# Patient Record
Sex: MALE | Race: WHITE | URBAN - METROPOLITAN AREA
[De-identification: names, ages, dates, MRNs, and addresses within clinical notes are randomized per-mention and may not be internally consistent; named-entity substitution may affect disease eponyms.]

---

## 2017-05-22 ENCOUNTER — INPATIENT (INPATIENT)
Facility: HOSPITAL | Age: 47
LOS: 0 days | Discharge: HOME | End: 2017-05-23
Attending: EMERGENCY MEDICINE | Admitting: GENERAL PRACTICE

## 2017-06-28 DIAGNOSIS — Z87.891 PERSONAL HISTORY OF NICOTINE DEPENDENCE: ICD-10-CM

## 2017-06-28 DIAGNOSIS — R20.2 PARESTHESIA OF SKIN: ICD-10-CM

## 2017-06-28 DIAGNOSIS — R07.89 OTHER CHEST PAIN: ICD-10-CM

## 2017-06-28 DIAGNOSIS — R07.9 CHEST PAIN, UNSPECIFIED: ICD-10-CM

## 2017-06-28 DIAGNOSIS — R06.02 SHORTNESS OF BREATH: ICD-10-CM

## 2018-04-04 ENCOUNTER — OUTPATIENT (OUTPATIENT)
Dept: OUTPATIENT SERVICES | Facility: HOSPITAL | Age: 48
LOS: 1 days | Discharge: HOME | End: 2018-04-04

## 2018-04-04 DIAGNOSIS — R91.1 SOLITARY PULMONARY NODULE: ICD-10-CM

## 2018-09-20 ENCOUNTER — EMERGENCY (EMERGENCY)
Facility: HOSPITAL | Age: 48
LOS: 0 days | Discharge: HOME | End: 2018-09-20
Attending: EMERGENCY MEDICINE | Admitting: EMERGENCY MEDICINE

## 2018-09-20 VITALS
TEMPERATURE: 99 F | OXYGEN SATURATION: 98 % | HEIGHT: 69 IN | HEART RATE: 81 BPM | WEIGHT: 220.02 LBS | RESPIRATION RATE: 18 BRPM | DIASTOLIC BLOOD PRESSURE: 85 MMHG | SYSTOLIC BLOOD PRESSURE: 132 MMHG

## 2018-09-20 DIAGNOSIS — M25.519 PAIN IN UNSPECIFIED SHOULDER: ICD-10-CM
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
## 2018-09-20 DIAGNOSIS — S29.011A STRAIN OF MUSCLE AND TENDON OF FRONT WALL OF THORAX, INITIAL ENCOUNTER: ICD-10-CM

## 2018-09-20 DIAGNOSIS — M25.511 PAIN IN RIGHT SHOULDER: ICD-10-CM

## 2018-09-20 RX ORDER — ACETAMINOPHEN 500 MG
650 TABLET ORAL ONCE
Qty: 0 | Refills: 0 | Status: COMPLETED | OUTPATIENT
Start: 2018-09-20 | End: 2018-09-20

## 2018-09-20 RX ADMIN — Medication 650 MILLIGRAM(S): at 06:24

## 2018-09-20 NOTE — ED PROVIDER NOTE - PHYSICAL EXAMINATION
CONSTITUTIONAL: Well-developed; well-nourished; in no acute distress.   SKIN: warm, dry; no overlying erythema noted; +swelling over right lateral chest wall with tenderness to palpation, asymmetrical compared to left side   NECK: Supple; non tender. no midline c-spine tenderness   EXT: decreased right shoulder abduction  NEURO: Alert, oriented, grossly unremarkable; C5-C8 sensation and strength intact   PSYCH: Cooperative, appropriate.

## 2018-09-20 NOTE — ED PROVIDER NOTE - CARE PLAN
Principal Discharge DX:	Muscle strain Principal Discharge DX:	Pectoral muscle rupture, initial encounter

## 2018-09-20 NOTE — ED PROVIDER NOTE - ATTENDING CONTRIBUTION TO CARE
I personally evaluated the patient. I reviewed the Resident’s or Physician Assistant’s note (as assigned above), and agree with the findings and plan except as documented in my note.  47 yr M, otherwise healthy with complaints of right chest and right upper arm pain and swelling that occurred about 1.5 hours ago while he was doing a bench press. He reports hearing a snap and then the onset of pain. No direct trauma to the chest. No skin color changes, no open wounds. VS reviewed, pt well appearing, NAD. Head ncat, normal s1s2 without any murmurs, Lungs CTAB with normal work of breathing. + edema and ttp of the right lateral pectoralis major muscle and also right proximal anterior humerus, painful abduction of the right shoulder, normal flexion, adduction, internal rotation, normal radial and brachial pulse b/l, no sensation deficits, abd +BS, s/nd, extremities wnl, neuro exam grossly normal. No acute skin rashes. Plan is left shoulder xray and reassess.

## 2018-09-20 NOTE — ED PROVIDER NOTE - OBJECTIVE STATEMENT
46 y/o male with no pmhx presents with acute right shoulder pain. Patient states he was doing bench presses earlier this morning, when he had a sudden sharp pain in right shoulder, chest area with a popping noise. Patient denies ever injuring this shoulder. Denies numbness/tingling.

## 2018-09-20 NOTE — ED PROVIDER NOTE - PROGRESS NOTE DETAILS
Discussed with pt the need for ortho follow up and his xray with a lucent lesion  pending radiology report.

## 2018-09-20 NOTE — ED PROVIDER NOTE - NS ED ROS FT
Constitutional: See HPI.  ENMT: No neck pain or stiffness.  MS: +right shoulder/chest pain  Neuro: No numbness/tingling                                    Skin: No redness; + swelling noted over right chest wall   Endo: No hx of DM, thyroid disease  Except as documented in HPI, all other review of systems is negative

## 2018-09-24 ENCOUNTER — OUTPATIENT (OUTPATIENT)
Dept: OUTPATIENT SERVICES | Facility: HOSPITAL | Age: 48
LOS: 1 days | Discharge: HOME | End: 2018-09-24

## 2018-09-24 VITALS
RESPIRATION RATE: 16 BRPM | DIASTOLIC BLOOD PRESSURE: 78 MMHG | TEMPERATURE: 98 F | OXYGEN SATURATION: 100 % | HEIGHT: 69 IN | SYSTOLIC BLOOD PRESSURE: 129 MMHG | WEIGHT: 220.9 LBS | HEART RATE: 66 BPM

## 2018-09-24 DIAGNOSIS — Z01.818 ENCOUNTER FOR OTHER PREPROCEDURAL EXAMINATION: ICD-10-CM

## 2018-09-24 DIAGNOSIS — S29.011A STRAIN OF MUSCLE AND TENDON OF FRONT WALL OF THORAX, INITIAL ENCOUNTER: ICD-10-CM

## 2018-09-24 DIAGNOSIS — Z90.49 ACQUIRED ABSENCE OF OTHER SPECIFIED PARTS OF DIGESTIVE TRACT: Chronic | ICD-10-CM

## 2018-09-24 LAB
ALBUMIN SERPL ELPH-MCNC: 4.3 G/DL — SIGNIFICANT CHANGE UP (ref 3.5–5.2)
ALP SERPL-CCNC: 78 U/L — SIGNIFICANT CHANGE UP (ref 30–115)
ALT FLD-CCNC: 57 U/L — HIGH (ref 0–41)
ANION GAP SERPL CALC-SCNC: 19 MMOL/L — HIGH (ref 7–14)
APTT BLD: 30.4 SEC — SIGNIFICANT CHANGE UP (ref 27–39.2)
AST SERPL-CCNC: 43 U/L — HIGH (ref 0–41)
BASOPHILS # BLD AUTO: 0.04 K/UL — SIGNIFICANT CHANGE UP (ref 0–0.2)
BASOPHILS NFR BLD AUTO: 0.3 % — SIGNIFICANT CHANGE UP (ref 0–1)
BILIRUB SERPL-MCNC: 0.5 MG/DL — SIGNIFICANT CHANGE UP (ref 0.2–1.2)
BUN SERPL-MCNC: 17 MG/DL — SIGNIFICANT CHANGE UP (ref 10–20)
CALCIUM SERPL-MCNC: 9.7 MG/DL — SIGNIFICANT CHANGE UP (ref 8.5–10.1)
CHLORIDE SERPL-SCNC: 100 MMOL/L — SIGNIFICANT CHANGE UP (ref 98–110)
CO2 SERPL-SCNC: 23 MMOL/L — SIGNIFICANT CHANGE UP (ref 17–32)
CREAT SERPL-MCNC: 1 MG/DL — SIGNIFICANT CHANGE UP (ref 0.7–1.5)
EOSINOPHIL # BLD AUTO: 0.23 K/UL — SIGNIFICANT CHANGE UP (ref 0–0.7)
EOSINOPHIL NFR BLD AUTO: 1.9 % — SIGNIFICANT CHANGE UP (ref 0–8)
GLUCOSE SERPL-MCNC: 53 MG/DL — LOW (ref 70–99)
HCT VFR BLD CALC: 47.3 % — SIGNIFICANT CHANGE UP (ref 42–52)
HGB BLD-MCNC: 16.5 G/DL — SIGNIFICANT CHANGE UP (ref 14–18)
IMM GRANULOCYTES NFR BLD AUTO: 0.3 % — SIGNIFICANT CHANGE UP (ref 0.1–0.3)
INR BLD: 1.12 RATIO — SIGNIFICANT CHANGE UP (ref 0.65–1.3)
LYMPHOCYTES # BLD AUTO: 19.4 % — LOW (ref 20.5–51.1)
LYMPHOCYTES # BLD AUTO: 2.3 K/UL — SIGNIFICANT CHANGE UP (ref 1.2–3.4)
MCHC RBC-ENTMCNC: 30.3 PG — SIGNIFICANT CHANGE UP (ref 27–31)
MCHC RBC-ENTMCNC: 34.9 G/DL — SIGNIFICANT CHANGE UP (ref 32–37)
MCV RBC AUTO: 86.8 FL — SIGNIFICANT CHANGE UP (ref 80–94)
MONOCYTES # BLD AUTO: 0.9 K/UL — HIGH (ref 0.1–0.6)
MONOCYTES NFR BLD AUTO: 7.6 % — SIGNIFICANT CHANGE UP (ref 1.7–9.3)
NEUTROPHILS # BLD AUTO: 8.35 K/UL — HIGH (ref 1.4–6.5)
NEUTROPHILS NFR BLD AUTO: 70.5 % — SIGNIFICANT CHANGE UP (ref 42.2–75.2)
NRBC # BLD: 0 /100 WBCS — SIGNIFICANT CHANGE UP (ref 0–0)
PLATELET # BLD AUTO: 205 K/UL — SIGNIFICANT CHANGE UP (ref 130–400)
POTASSIUM SERPL-MCNC: 4.3 MMOL/L — SIGNIFICANT CHANGE UP (ref 3.5–5)
POTASSIUM SERPL-SCNC: 4.3 MMOL/L — SIGNIFICANT CHANGE UP (ref 3.5–5)
PROT SERPL-MCNC: 7.6 G/DL — SIGNIFICANT CHANGE UP (ref 6–8)
PROTHROM AB SERPL-ACNC: 12.1 SEC — SIGNIFICANT CHANGE UP (ref 9.95–12.87)
RBC # BLD: 5.45 M/UL — SIGNIFICANT CHANGE UP (ref 4.7–6.1)
RBC # FLD: 13.2 % — SIGNIFICANT CHANGE UP (ref 11.5–14.5)
SODIUM SERPL-SCNC: 142 MMOL/L — SIGNIFICANT CHANGE UP (ref 135–146)
WBC # BLD: 11.86 K/UL — HIGH (ref 4.8–10.8)
WBC # FLD AUTO: 11.86 K/UL — HIGH (ref 4.8–10.8)

## 2018-09-24 NOTE — H&P PST ADULT - ADDITIONAL PE
no elvira tmd > 3 fbd neck from  left arm dominant rt arm sling fingers w and m brisk cap refill daniel test neg

## 2018-09-24 NOTE — H&P PST ADULT - REASON FOR ADMISSION
46 yr old man to past for right pectoralis major tendon repair pt is wt  and injury occurred 9/20 to ed xray ref to sx sent for mri now for sx left hand dominant NO fever no uti uri cp palp sob pain at this time Exercise tolerance is 3 flights no changes no elvira screen revd pt states to hsop 5/17 "panic attack " cardio wk up neg has cta on chart calcium score 0 noted on ct scan lll nodules ref to pulm saw 4/18 follow up scan yearly no changes

## 2018-09-24 NOTE — H&P PST ADULT - PMH
Anxiety  5/17 "panic attack" to ed wk up neg cardio  cta on chart noted left lower lobe nodules  Pulmonary nodules  lef tlower lobe dx 2017

## 2018-09-24 NOTE — H&P PST ADULT - NSANTHOSAYNRD_GEN_A_CORE
No. AGAPITO screening performed.  STOP BANG Legend: 0-2 = LOW Risk; 3-4 = INTERMEDIATE Risk; 5-8 = HIGH Risk

## 2018-09-25 DIAGNOSIS — J44.9 CHRONIC OBSTRUCTIVE PULMONARY DISEASE, UNSPECIFIED: ICD-10-CM

## 2018-10-03 NOTE — PROGRESS NOTE ADULT - SUBJECTIVE AND OBJECTIVE BOX
Document reviewed - MED. DIR. PAST - ANESTHESIA - Patient w/ h/o pulmonary nodules LLL since 2017 . Spoke to [facility] ,  - will proceed w/ surgery as planned .

## 2018-10-04 ENCOUNTER — OUTPATIENT (OUTPATIENT)
Dept: OUTPATIENT SERVICES | Facility: HOSPITAL | Age: 48
LOS: 1 days | Discharge: HOME | End: 2018-10-04

## 2018-10-04 VITALS
SYSTOLIC BLOOD PRESSURE: 152 MMHG | DIASTOLIC BLOOD PRESSURE: 74 MMHG | OXYGEN SATURATION: 95 % | HEART RATE: 80 BPM | RESPIRATION RATE: 19 BRPM

## 2018-10-04 VITALS — WEIGHT: 220.9 LBS | HEIGHT: 69 IN

## 2018-10-04 DIAGNOSIS — Z90.49 ACQUIRED ABSENCE OF OTHER SPECIFIED PARTS OF DIGESTIVE TRACT: Chronic | ICD-10-CM

## 2018-10-04 DIAGNOSIS — Z88.0 ALLERGY STATUS TO PENICILLIN: ICD-10-CM

## 2018-10-04 DIAGNOSIS — S46.811A STRAIN OF OTHER MUSCLES, FASCIA AND TENDONS AT SHOULDER AND UPPER ARM LEVEL, RIGHT ARM, INITIAL ENCOUNTER: ICD-10-CM
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
## 2018-10-04 DIAGNOSIS — Z87.891 PERSONAL HISTORY OF NICOTINE DEPENDENCE: ICD-10-CM

## 2018-10-04 DIAGNOSIS — F41.0 PANIC DISORDER [EPISODIC PAROXYSMAL ANXIETY]: ICD-10-CM
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
## 2018-10-04 RX ORDER — SODIUM CHLORIDE 9 MG/ML
1000 INJECTION, SOLUTION INTRAVENOUS
Qty: 0 | Refills: 0 | Status: DISCONTINUED | OUTPATIENT
Start: 2018-10-04 | End: 2018-10-19

## 2018-10-04 RX ORDER — MORPHINE SULFATE 50 MG/1
4 CAPSULE, EXTENDED RELEASE ORAL
Qty: 0 | Refills: 0 | Status: DISCONTINUED | OUTPATIENT
Start: 2018-10-04 | End: 2018-10-04

## 2018-10-04 RX ORDER — ONDANSETRON 8 MG/1
4 TABLET, FILM COATED ORAL ONCE
Qty: 0 | Refills: 0 | Status: DISCONTINUED | OUTPATIENT
Start: 2018-10-04 | End: 2018-10-19

## 2018-10-04 RX ORDER — OXYCODONE AND ACETAMINOPHEN 5; 325 MG/1; MG/1
2 TABLET ORAL ONCE
Qty: 0 | Refills: 0 | Status: DISCONTINUED | OUTPATIENT
Start: 2018-10-04 | End: 2018-10-04

## 2018-10-04 RX ORDER — HYDROMORPHONE HYDROCHLORIDE 2 MG/ML
0.5 INJECTION INTRAMUSCULAR; INTRAVENOUS; SUBCUTANEOUS
Qty: 0 | Refills: 0 | Status: DISCONTINUED | OUTPATIENT
Start: 2018-10-04 | End: 2018-10-04

## 2018-10-04 RX ORDER — MEPERIDINE HYDROCHLORIDE 50 MG/ML
12.5 INJECTION INTRAMUSCULAR; INTRAVENOUS; SUBCUTANEOUS ONCE
Qty: 0 | Refills: 0 | Status: DISCONTINUED | OUTPATIENT
Start: 2018-10-04 | End: 2018-10-04

## 2018-10-04 RX ORDER — OXYCODONE AND ACETAMINOPHEN 5; 325 MG/1; MG/1
1 TABLET ORAL
Qty: 12 | Refills: 0
Start: 2018-10-04

## 2018-10-04 RX ORDER — DEXAMETHASONE 0.5 MG/5ML
4 ELIXIR ORAL ONCE
Qty: 0 | Refills: 0 | Status: DISCONTINUED | OUTPATIENT
Start: 2018-10-04 | End: 2018-10-19

## 2018-10-04 RX ADMIN — MEPERIDINE HYDROCHLORIDE 12.5 MILLIGRAM(S): 50 INJECTION INTRAMUSCULAR; INTRAVENOUS; SUBCUTANEOUS at 16:52

## 2018-10-04 RX ADMIN — HYDROMORPHONE HYDROCHLORIDE 0.5 MILLIGRAM(S): 2 INJECTION INTRAMUSCULAR; INTRAVENOUS; SUBCUTANEOUS at 16:43

## 2018-10-04 RX ADMIN — MEPERIDINE HYDROCHLORIDE 12.5 MILLIGRAM(S): 50 INJECTION INTRAMUSCULAR; INTRAVENOUS; SUBCUTANEOUS at 18:29

## 2018-10-04 RX ADMIN — SODIUM CHLORIDE 125 MILLILITER(S): 9 INJECTION, SOLUTION INTRAVENOUS at 18:28

## 2018-10-04 RX ADMIN — HYDROMORPHONE HYDROCHLORIDE 0.5 MILLIGRAM(S): 2 INJECTION INTRAMUSCULAR; INTRAVENOUS; SUBCUTANEOUS at 18:29

## 2018-10-04 NOTE — ASU DISCHARGE PLAN (ADULT/PEDIATRIC). - SPECIAL INSTRUCTIONS
Post Operative Instructions for Shoulder Surgery    Your Surgery Included  [ ] Rotator Cuff Repair			  [ ] Debridement				  [ ] Biceps Tenodesis/Tenotomy	  [ ] Distal Clavicle Resection		  [ ] SLAP Repair  [ ] Instability Repair  [ ] Lysis of Adhesions/Manipulation  [x ] Other: pectoralis muscle repair  	  Call our office ([phone]) immediately if you experience any of the following:  •	Excessive bleeding or pus like drainage at the incision site  •	Uncontrollable pain not relieved by pain medication  •	Excessive swelling or redness at the incision site  •	Fever above 101.5 degrees not controlled with Tylenol or Motrin  •	Shortness of Breath  •	Any foul odor or blistering from the surgery site    Pain Management: You were given one or more of the following medication prescriptions before leaving the hospital. Have the prescriptions filled at a pharmacy on your way home and follow the instructions on the bottles.   Regional Anesthesia Injections (Blocks): You may have been given a regional nerve block either before or after surgery. This may numb your shoulder for 24-36 hours    Diet: Eat a bland diet for the first day after surgery. Progress your diet as tolerated. Constipation may occur with Narcotic usage, contact our office if you are experiencing constipation.    Activity: After you arrive at home, spend most of the first 24 hours resting in bed, on the couch, or in a reclining chair. After the first 24 hours at home, slowly increase your activity level based on your symptoms.    Dressing Change: Remove the dressing on the 3rd day. It is normal for some blood to be seen on the dressings. It is also normal for you to see apparent bruising on the skin around your shoulder when you remove the dressing. If present, leave the steri-strip tape across the incisions. If you are concerned by the drainage or the appearance of your shoulder, please call one of the numbers listed below. Keep incisions covered with Band-Aids/bandages.    Showering: You may shower on the 5th day after surgery if the wound is dry and clean, but do not let the wound soak in water until sutures are removed. Do not submerge in any water until after your postoperative appointment in clinic.    Shoulder Sling: You may have been sent home with a sling / pillow attachment holding your arm away from your body. You may remove the sling when changing clothes or bathing. Make sure to wear the sling while sleeping unless instructed otherwise. You may remove for exercises.    Shoulder Exercises: You may do these exercises for 2-5 mins five times a day in order to help regain your range of motion.  [ ] Shoulder Shrugs: Shrug your shoulders up and down  [ ] Pendulums: Bend forward allowing your arm to hang down in front of you. Gently swing your arm side-to-side and front to back  [x ] Elbow range of motion: Straighten and bend your elbow  [ ] Scapula Retractions: Squeeze shoulder blades together while slightly pulling them down  [ ] Passive Abduction: Have family member lift your arm away from your body bringing your elbow to the level of your shoulder  [ ] Shoulder rotation: With your arm at your side, have a family member rotate your arm internally and externally  [ ] Pulley exercises: Put a towel over the top of a door and face the door, use your good arm to pull your arm up in front of you    Follow Up: As Scheduled

## 2018-10-04 NOTE — BRIEF OPERATIVE NOTE - PROCEDURE
<<-----Click on this checkbox to enter Procedure Repair of pectoralis major muscle  10/04/2018    Active  OSWALDO

## 2018-10-04 NOTE — CHART NOTE - NSCHARTNOTEFT_GEN_A_CORE
Anesthesia Post Op Assessment  		(    ) Intubated           TV _____	Rate _sv____	FiO2__4LNC___  		(  x  ) Patent airway. Full return of protective reflexes  		(   x )Full recovery from anesthesia/sedation to baseline status      Cardiovascular Function:  		BP:	180/96                  Pulse:		93                  RR:		12                  Temp:		98.4                  O2Sat:             96%      Mental Status:  	        (  x  ) awake		  (  x  ) alert		 (    ) drowsy	               (    ) sedated      Nausea/Vomiting:  		(    ) Yes, See post-op orders		   (  x  ) No      Pain Scale: (0-10):			Treatment:     (    ) None	            (  x  ) See Post-Op/PCA Orders      Post-operative Fluids: 	   (    ) Oral	          ( x   ) See post-op Orders        Comments:    Uneventful. No complications from anesthesia.  Discharge home when criteria met.

## 2021-06-14 NOTE — ED ADULT TRIAGE NOTE - NS ED NOTE TRAVEL COUNTRIES
Constitutional: well-developed, normal communication ability.   Eyes: Conjunctivae and eyelids appear normal, no scleral icterus. Respiratory: symmetric expansion of chest wall, normal work of breathing   Gastrointestinal:  normoactive bowel sounds, soft, no tenderness, no rebound tenderness, no shifting dullness, no organomegaly.   Musculoskeletal: normal gait and station   Skin: no jaundice   Neurologic: Oriented to person, place, time. Short term memory intact.    Psychiatric: Normal mood and appropriate affect.
[place]

## 2021-10-06 PROBLEM — R91.8 OTHER NONSPECIFIC ABNORMAL FINDING OF LUNG FIELD: Chronic | Status: ACTIVE | Noted: 2018-09-24

## 2021-10-06 PROBLEM — F41.9 ANXIETY DISORDER, UNSPECIFIED: Chronic | Status: ACTIVE | Noted: 2018-09-24

## 2021-10-08 PROBLEM — Z00.00 ENCOUNTER FOR PREVENTIVE HEALTH EXAMINATION: Status: ACTIVE | Noted: 2021-10-08

## 2021-10-11 ENCOUNTER — APPOINTMENT (OUTPATIENT)
Age: 51
End: 2021-10-11

## 2022-02-23 NOTE — H&P PST ADULT - NS PRO AD NO ADVANCE DIRECTIVE
Pulmonary Progress Note    Date of admission  2/16/2022    Chief Complaint  74 y.o. female admitted 2/16/2022 with SOB    Hospital Course  74 y.o. female who presented 2/16/2022 with leg swelling b/l with cellulitis of right lower extremity, Sob and diarrhea. Rx for cellulitis and heart failure. Blood cxs negative   Hx of chronic obstructive pulmonary disease (no PFTs and no CT chest ), chronic hypoxemic respiratory failure on 2 L of oxygen at baseline, hypertension, diabetes, dyslipidemia and hypothyroidism and moderate aortic stenosis     Her Fio2 requirement went up from 2 l to 5 l and was wheezing and dyspneac on day2 of hospitalization  started on rx for COPD with steroids. She improved with Fio2 requirement back to baseline    2/19: Developed acute respiratory distress and AMS on 2/19/22 a.m- hypercapneic with ph 7.19/71/296 on 100% NRB  Pt Altered and not following commands  Troponin increased to 131 and ekg initially done concerning for ST depression and with AMS and paco2 dropped to 69 after 45 mins of BIPAP decision made to intubate.  Wbc increased to 16.7, creatinine worsening to 1.25, probnp 3459 and trop 131 with lactic 3.8,      D/w cardiology DR. Esquivel  re: NSTEMI  At this time resp distress from pulm cause hypercapneic resp failure  No ST elevation  Trend troponin if markedly in 1000  Level to reassess  Heparin, Asa, lipitor    Interval Problem Update  Chart review from the past 24 hours includes imaging, laboratory studies, vital signs and notes available.  Pertinent data for today's visit includes    Cardiac: VSS, leonidas on precedex   Pulm: 30%/8  Heme: stable  /renal: Cr now improving with diuresis  ID:  +GPCs in Bcx likely coag neg staph, repeat cx neg. Neg MRSA - cefepime and flagyl. Wbc downtrending  I/O: -2.5L with lasix 40 IV BID  GI/endo: TFs      Review of Systems  Review of Systems   Unable to perform ROS: Acuity of condition        Vital Signs for last 24 hours   Temp:  [36.4 °C  (97.6 °F)-36.9 °C (98.5 °F)] 36.8 °C (98.2 °F)  Pulse:  [] 77  Resp:  [16-47] 21  BP: ()/() 107/35  SpO2:  [82 %-100 %] 94 %         Respiratory Information for the last 24 hours  Vent Mode: APVCMV  Rate (breaths/min): 20  Vt Target (mL): 320  PEEP/CPAP: 5  MAP: 7  Control VTE (exp VT): 418    Physical Exam   Physical Exam  Constitutional:       Appearance: She is obese.      Comments: Unkempt, intubated and sedated  Off sedation following commands but asynchronous with ventilator   HENT:      Head: Normocephalic and atraumatic.      Mouth/Throat:      Mouth: Mucous membranes are dry.   Eyes:      Extraocular Movements: Extraocular movements intact.      Pupils: Pupils are equal, round, and reactive to light.   Cardiovascular:      Heart sounds: Murmur heard.   Pulmonary:      Comments: Diminished BS b/l  Abdominal:      General: Bowel sounds are normal. There is distension.      Palpations: Abdomen is soft.   Musculoskeletal:      Cervical back: Neck supple.      Right lower leg: Edema present.      Left lower leg: Edema present.   Skin:     General: Skin is warm and dry.      Comments: erythema lower extremities   Neurological:      Comments: Unable to assess  sedated   Psychiatric:      Comments: Unable to assess  Sedated and intubated         Medications  Current Facility-Administered Medications   Medication Dose Route Frequency Provider Last Rate Last Admin   • furosemide (LASIX) injection 40 mg  40 mg Intravenous BID DIURETIC Lien Lee M.D.   40 mg at 02/22/22 1516   • metroNIDAZOLE (FLAGYL) tablet 500 mg  500 mg Enteral Tube Q8HRS Lien Lee M.D.   500 mg at 02/22/22 1516   • acetaminophen (Tylenol) tablet 650 mg  650 mg Enteral Tube Q6HRS ZEESHAN Bertrand M.D.       • insulin regular (HumuLIN R,NovoLIN R) injection  1-6 Units Subcutaneous 4X/DAY JAY Bertrand M.D.   3 Units at 02/22/22 1057    And   • dextrose 50% (D50W) injection 50 mL  50 mL  Intravenous Q15 MIN PRN Ashia Bertrand M.D.       • insulin GLARGINE (Lantus,Semglee) injection  5 Units Subcutaneous BID Ashia Bertrand M.D.   5 Units at 02/22/22 0622   • propofol (DIPRIVAN) injection  0-80 mcg/kg/min Intravenous Continuous Ashia Bertrand M.D. 19.8 mL/hr at 02/22/22 1602 30 mcg/kg/min at 02/22/22 1602   • cefepime (Maxipime) 2 g in  mL IVPB  2 g Intravenous Q12HRS Ashia Bertrand M.D.   Stopped at 02/22/22 0604   • dakins 0.125% (1/4 strength) topical soln   Topical BID Ashia Bertrand M.D.   20 mL at 02/22/22 0551   • prochlorperazine (COMPAZINE) injection 10 mg  10 mg Intravenous Q6HRS PRN Alley Longo M.D.   10 mg at 02/19/22 0013   • heparin infusion 25,000 units in 500 mL 0.45% NACL  0-30 Units/kg/hr (Adjusted) Intravenous Continuous Ashia Bertrand M.D. 29.2 mL/hr at 02/22/22 1400 20 Units/kg/hr at 02/22/22 1400   • heparin injection 2,900 Units  40 Units/kg (Adjusted) Intravenous PRN Ashia Bertrand M.D.   2,900 Units at 02/22/22 0115   • aspirin (ASA) tablet 325 mg  325 mg Enteral Tube DAILY Ashia Bertrand M.D.   325 mg at 02/22/22 0532   • Respiratory Therapy Consult   Nebulization Continuous RT Ashia Bertrand M.D.       • famotidine (PEPCID) tablet 20 mg  20 mg Enteral Tube DAILY Ashia Bertrand M.D.   20 mg at 02/22/22 0533    Or   • famotidine (PEPCID) injection 20 mg  20 mg Intravenous DAILY Ashia Bertrand M.D.   20 mg at 02/19/22 0908   • senna-docusate (PERICOLACE or SENOKOT S) 8.6-50 MG per tablet 2 Tablet  2 Tablet Enteral Tube BID Ashia Bertrand M.D.   2 Tablet at 02/22/22 0534    And   • polyethylene glycol/lytes (MIRALAX) PACKET 1 Packet  1 Packet Enteral Tube QDAY PRN Ashia Bertrand M.D.        And   • magnesium hydroxide (MILK OF MAGNESIA) suspension 30 mL  30 mL Enteral Tube QDAY PRN Ashia Bertrand M.D.        And   • bisacodyl (DULCOLAX) suppository 10 mg  10 mg  Rectal QDAY PRN Ashia Bertrand M.D.       • MD Alert...ICU Electrolyte Replacement per Pharmacy   Other PHARMACY TO DOSE Ashia Bertrand M.D.       • lidocaine (XYLOCAINE) 1 % injection 2 mL  2 mL Tracheal Tube Q30 MIN PRN Ashia Bertrand M.D.       • fentaNYL (SUBLIMAZE) 50 mcg/mL in 50mL   Intravenous Continuous Ashia Bertrand M.D.   Paused at 02/21/22 1055   • norepinephrine (Levophed) 8 mg in 250 mL NS infusion (premix)  0-30 mcg/min Intravenous Continuous Ashia Bertrand M.D.   Paused at 02/21/22 1230   • ascorbic acid (Vitamin C) tablet 500 mg  500 mg Enteral Tube BID Ashia Bertrand M.D.   500 mg at 02/22/22 0532   • therapeutic multivitamin-minerals (THERAGRAN-M) tablet 1 Tablet  1 Tablet Enteral Tube DAILY Asejuan miguel Stroud M.D.   1 Tablet at 02/22/22 0533   • atorvastatin (LIPITOR) tablet 40 mg  40 mg Enteral Tube DAILY Thomas Stroud M.D.   40 mg at 02/22/22 0532   • [Held by provider] bumetanide (BUMEX) tablet 1 mg  1 mg Enteral Tube Q DAY Thomas Stroud M.D.   1 mg at 02/20/22 0519   • levothyroxine (SYNTHROID) tablet 25 mcg  25 mcg Enteral Tube DAILY Thomas Stroud M.D.   25 mcg at 02/22/22 0534   • sertraline (Zoloft) tablet 50 mg  50 mg Enteral Tube DAILY Thomas Stroud M.D.   50 mg at 02/22/22 0534   • ondansetron (ZOFRAN) syringe/vial injection 4 mg  4 mg Intravenous Q4HRS PRN Alley Longo M.D.   4 mg at 02/18/22 2250   • Respiratory Therapy Consult   Nebulization Continuous RT Allyson Ulloa D.O.       • ipratropium-albuterol (DUONEB) nebulizer solution  3 mL Nebulization Q2HRS PRN (RT) Allyson Ulloa D.O.       • nystatin (MYCOSTATIN) powder   Topical BID Allyson Ulloa D.O.   Given at 02/22/22 5702   • Pharmacy Consult Request  1 Each Other PHARMACY TO DOSE Thomas Stroud M.D.       • [Held by provider] benazepril (LOTENSIN) tablet 20 mg  20 mg Oral Q DAY Thomas Stroud M.D.       • lactated ringers infusion (BOLUS)  500 mL  Intravenous Once PRN Thomas Stroud M.D.       • lactated ringers infusion (BOLUS): BMI greater than 30  30 mL/kg (Ideal) Intravenous Once PRN Thomas Stroud M.D.           Fluids    Intake/Output Summary (Last 24 hours) at 2/22/2022 1616  Last data filed at 2/22/2022 1400  Gross per 24 hour   Intake 1118.72 ml   Output 4215 ml   Net -3096.28 ml       Laboratory  Recent Labs     02/20/22  0504   ISTATAPH 7.272*   ISTATAPCO2 59.1*   ISTATAPO2 102*   ISTATATCO2 29   OEEUWOD9RBS 97   ISTATARTHCO3 27.2*   ISTATARTBE 0   ISTATTEMP see below   ISTATFIO2 40   ISTATSPEC Arterial         Recent Labs     02/20/22 0345 02/21/22 0315 02/22/22  0357   SODIUM 138 139 136   POTASSIUM 4.9 4.8 4.1   CHLORIDE 104 103 100   CO2 24 22 25   BUN 60* 73* 73*   CREATININE 1.42* 1.59* 1.34   MAGNESIUM 2.1 2.2 2.1   PHOSPHORUS 3.4 5.0* 4.1   CALCIUM 10.1 9.6 9.2     Recent Labs     02/20/22  0345 02/21/22  0315 02/21/22  1050 02/22/22  0357   ALTSGPT  --  30  --   --    ASTSGOT  --  16  --   --    ALKPHOSPHAT  --  177*  --   --    TBILIRUBIN  --  <0.2  --   --    PREALBUMIN  --   --  17.1* 19.1   GLUCOSE 136* 158*  --  211*     Recent Labs     02/20/22 0345 02/21/22 0315 02/22/22  0357   WBC 19.1* 16.7* 15.4*   NEUTSPOLYS 76.50* 74.60* 72.90*   LYMPHOCYTES 13.40* 14.10* 12.50*   MONOCYTES 6.90 5.10 4.40   EOSINOPHILS 0.20 2.30 3.50   BASOPHILS 0.20 0.30 0.30   ASTSGOT  --  16  --    ALTSGPT  --  30  --    ALKPHOSPHAT  --  177*  --    TBILIRUBIN  --  <0.2  --      Recent Labs     02/20/22  0345 02/21/22  0315 02/22/22  0357   RBC 3.13* 2.91* 2.89*   HEMOGLOBIN 9.8* 9.1* 9.1*   HEMATOCRIT 32.4* 31.3* 30.4*   PLATELETCT 490* 421 397       Imaging  CXR: diffuse reticular marking suggestive of pulm edema  ET tube in good position  Feeding tube below the diaphragm     Assessment/Plan  SRIRAM (acute kidney injury) (HCC)  Assessment & Plan  - initially thought to be 2/2 overdiuresis but now Cr trending up again likely from some fluid overload -  improving now with lasix  - continue lasix 40mg IV BID  - avoid nephrotoxins and NSAIDs    Bacteremia  Assessment & Plan  - cellulitis and decub ulcer as well as diarrhea  - blood cx + for gram + cocci in anaerobic bottle - likely coag neg staph  - decub ulcer unstageable and cultures sent 2/20  - wound care consulted    NSTEMI (non-ST elevated myocardial infarction) ([facility])  Assessment & Plan  - may be supply and demand  - continue to trend troponin  - d/w cardiology see body of text on 2/19  - Heparin, ASA and lipitor  - Hyperdynamic last ECHO repeat 2/19 showed EF now 40%    COPD with acute exacerbation ([facility])  Assessment & Plan  Continue prednisone until we can assess synchrony with the ventilator and reassess if still needs steroids  No secretions  Duonebs prn    Decubitus ulcer- (present on admission)  Assessment & Plan  Unstageable   MVI daily and vitamin c bid   Wound care reconsulted    Cellulitis of left lower extremity- (present on admission)  Assessment & Plan  Gram + cocci in anaerobic bottle  Continue flagyl and cefepime     Acute on chronic respiratory failure with hypoxia ([facility])- (present on admission)  Assessment & Plan  COPD (no PFTs or CT chest), SONIA, OHS and heart failure  Hypercapnea was predominant feature that resulted in intubation 2/19  ? COPD and heart failure and NSTEMI combination  Not clear  Not wheezing  CXR suggests heart failure  Driving pressure not high but asynchronous with ventilator and so extending expiration time closer to 3 to allow for better asynchrony      DM (diabetes mellitus) ([facility])- (present on admission)  Assessment & Plan  Lantus 6 units bid  Trying to keep FS between 110-180       VTE:  Lovenox  Ulcer: H2 Antagonist  Lines: Nye Catheter  Ongoing indication addressed    I have performed a physical exam and reviewed and updated ROS and Plan today (2/22/2022). In review of yesterday's note (2/21/2022), there are no changes except as documented above.     Discussed patient  condition and risk of morbidity and/or mortality with Charge nurse / hot rounds   MAIAGASTON is her friend Rhonda Hassan [phone] and has been updated (She lives in [place] but in [place] currently)  The patient remains critically ill.  Critical care time = 45 minutes in directly providing and coordinating critical care and extensive data review.  No time overlap and excludes procedures.        Lien Lee MD  Pulmonary and Critical Care Medicine  [facility]     No

## 2022-08-12 NOTE — H&P PST ADULT - VENOUS THROMBOEMBOLISM HISTORY
[facility] Progress Note:  Update provided to Lynne Lombardo. Pt will need updated PT/OT and ST notes for insurance authorization prior to discharge. Anticipate will need updated notes on Monday to submit for auth if medically stable.  3:20 PM     negative history

## 2023-08-27 ENCOUNTER — INPATIENT (INPATIENT)
Facility: HOSPITAL | Age: 53
LOS: 0 days | Discharge: LEFT BEFORE DISCHARGE | DRG: 312 | End: 2023-08-28
Attending: INTERNAL MEDICINE | Admitting: INTERNAL MEDICINE
Payer: COMMERCIAL

## 2023-08-27 VITALS
SYSTOLIC BLOOD PRESSURE: 151 MMHG | OXYGEN SATURATION: 99 % | WEIGHT: 220.02 LBS | RESPIRATION RATE: 16 BRPM | DIASTOLIC BLOOD PRESSURE: 86 MMHG | HEART RATE: 87 BPM | TEMPERATURE: 94 F

## 2023-08-27 VITALS
WEIGHT: 225.75 LBS | RESPIRATION RATE: 18 BRPM | OXYGEN SATURATION: 96 % | SYSTOLIC BLOOD PRESSURE: 158 MMHG | TEMPERATURE: 97 F | DIASTOLIC BLOOD PRESSURE: 84 MMHG | HEART RATE: 80 BPM | HEIGHT: 69 IN

## 2023-08-27 DIAGNOSIS — R53.1 WEAKNESS: ICD-10-CM

## 2023-08-27 DIAGNOSIS — Z90.49 ACQUIRED ABSENCE OF OTHER SPECIFIED PARTS OF DIGESTIVE TRACT: Chronic | ICD-10-CM

## 2023-08-27 DIAGNOSIS — R07.9 CHEST PAIN, UNSPECIFIED: ICD-10-CM

## 2023-08-27 DIAGNOSIS — R09.89 OTHER SPECIFIED SYMPTOMS AND SIGNS INVOLVING THE CIRCULATORY AND RESPIRATORY SYSTEMS: ICD-10-CM

## 2023-08-27 DIAGNOSIS — Z87.828 PERSONAL HISTORY OF OTHER (HEALED) PHYSICAL INJURY AND TRAUMA: Chronic | ICD-10-CM

## 2023-08-27 LAB
ALBUMIN SERPL ELPH-MCNC: 4.4 G/DL — SIGNIFICANT CHANGE UP (ref 3.5–5.2)
ALP SERPL-CCNC: 69 U/L — SIGNIFICANT CHANGE UP (ref 30–115)
ALT FLD-CCNC: 31 U/L — SIGNIFICANT CHANGE UP (ref 0–41)
ANION GAP SERPL CALC-SCNC: 11 MMOL/L — SIGNIFICANT CHANGE UP (ref 7–14)
AST SERPL-CCNC: 27 U/L — SIGNIFICANT CHANGE UP (ref 0–41)
BASE EXCESS BLDV CALC-SCNC: 2.3 MMOL/L — SIGNIFICANT CHANGE UP (ref -2–3)
BASOPHILS # BLD AUTO: 0.02 K/UL — SIGNIFICANT CHANGE UP (ref 0–0.2)
BASOPHILS NFR BLD AUTO: 0.2 % — SIGNIFICANT CHANGE UP (ref 0–1)
BILIRUB SERPL-MCNC: 0.8 MG/DL — SIGNIFICANT CHANGE UP (ref 0.2–1.2)
BUN SERPL-MCNC: 19 MG/DL — SIGNIFICANT CHANGE UP (ref 10–20)
CA-I SERPL-SCNC: 1.13 MMOL/L — LOW (ref 1.15–1.33)
CALCIUM SERPL-MCNC: 9.5 MG/DL — SIGNIFICANT CHANGE UP (ref 8.4–10.5)
CHLORIDE SERPL-SCNC: 101 MMOL/L — SIGNIFICANT CHANGE UP (ref 98–110)
CK MB CFR SERPL CALC: 3.2 NG/ML — SIGNIFICANT CHANGE UP (ref 0.6–6.3)
CK SERPL-CCNC: 203 U/L — SIGNIFICANT CHANGE UP (ref 0–225)
CK SERPL-CCNC: 289 U/L — HIGH (ref 0–225)
CO2 SERPL-SCNC: 26 MMOL/L — SIGNIFICANT CHANGE UP (ref 17–32)
CREAT SERPL-MCNC: 1.2 MG/DL — SIGNIFICANT CHANGE UP (ref 0.7–1.5)
EGFR: 72 ML/MIN/1.73M2 — SIGNIFICANT CHANGE UP
EOSINOPHIL # BLD AUTO: 0.12 K/UL — SIGNIFICANT CHANGE UP (ref 0–0.7)
EOSINOPHIL NFR BLD AUTO: 1.3 % — SIGNIFICANT CHANGE UP (ref 0–8)
GAS PNL BLDV: 131 MMOL/L — LOW (ref 136–145)
GAS PNL BLDV: SIGNIFICANT CHANGE UP
GLUCOSE BLDC GLUCOMTR-MCNC: 152 MG/DL — HIGH (ref 70–99)
GLUCOSE SERPL-MCNC: 142 MG/DL — HIGH (ref 70–99)
HCO3 BLDV-SCNC: 26 MMOL/L — SIGNIFICANT CHANGE UP (ref 22–29)
HCT VFR BLD CALC: 50.9 % — SIGNIFICANT CHANGE UP (ref 42–52)
HCT VFR BLDA CALC: 55 % — HIGH (ref 39–51)
HGB BLD CALC-MCNC: 18.4 G/DL — HIGH (ref 12.6–17.4)
HGB BLD-MCNC: 17.6 G/DL — SIGNIFICANT CHANGE UP (ref 14–18)
IMM GRANULOCYTES NFR BLD AUTO: 0.2 % — SIGNIFICANT CHANGE UP (ref 0.1–0.3)
LACTATE BLDV-MCNC: 2.3 MMOL/L — HIGH (ref 0.5–2)
LYMPHOCYTES # BLD AUTO: 2.21 K/UL — SIGNIFICANT CHANGE UP (ref 1.2–3.4)
LYMPHOCYTES # BLD AUTO: 23.6 % — SIGNIFICANT CHANGE UP (ref 20.5–51.1)
MAGNESIUM SERPL-MCNC: 1.9 MG/DL — SIGNIFICANT CHANGE UP (ref 1.8–2.4)
MCHC RBC-ENTMCNC: 30.9 PG — SIGNIFICANT CHANGE UP (ref 27–31)
MCHC RBC-ENTMCNC: 34.6 G/DL — SIGNIFICANT CHANGE UP (ref 32–37)
MCV RBC AUTO: 89.5 FL — SIGNIFICANT CHANGE UP (ref 80–94)
MONOCYTES # BLD AUTO: 0.85 K/UL — HIGH (ref 0.1–0.6)
MONOCYTES NFR BLD AUTO: 9.1 % — SIGNIFICANT CHANGE UP (ref 1.7–9.3)
NEUTROPHILS # BLD AUTO: 6.15 K/UL — SIGNIFICANT CHANGE UP (ref 1.4–6.5)
NEUTROPHILS NFR BLD AUTO: 65.6 % — SIGNIFICANT CHANGE UP (ref 42.2–75.2)
NRBC # BLD: 0 /100 WBCS — SIGNIFICANT CHANGE UP (ref 0–0)
NT-PROBNP SERPL-SCNC: 29 PG/ML — SIGNIFICANT CHANGE UP (ref 0–300)
PCO2 BLDV: 39 MMHG — LOW (ref 42–55)
PH BLDV: 7.44 — HIGH (ref 7.32–7.43)
PLATELET # BLD AUTO: 163 K/UL — SIGNIFICANT CHANGE UP (ref 130–400)
PMV BLD: 12.5 FL — HIGH (ref 7.4–10.4)
PO2 BLDV: 48 MMHG — SIGNIFICANT CHANGE UP
POTASSIUM BLDV-SCNC: 4.2 MMOL/L — SIGNIFICANT CHANGE UP (ref 3.5–5.1)
POTASSIUM SERPL-MCNC: 4.3 MMOL/L — SIGNIFICANT CHANGE UP (ref 3.5–5)
POTASSIUM SERPL-SCNC: 4.3 MMOL/L — SIGNIFICANT CHANGE UP (ref 3.5–5)
PROT SERPL-MCNC: 7.5 G/DL — SIGNIFICANT CHANGE UP (ref 6–8)
RBC # BLD: 5.69 M/UL — SIGNIFICANT CHANGE UP (ref 4.7–6.1)
RBC # FLD: 13.2 % — SIGNIFICANT CHANGE UP (ref 11.5–14.5)
SAO2 % BLDV: 79.2 % — SIGNIFICANT CHANGE UP
SODIUM SERPL-SCNC: 138 MMOL/L — SIGNIFICANT CHANGE UP (ref 135–146)
TROPONIN T SERPL-MCNC: <0.01 NG/ML — SIGNIFICANT CHANGE UP
TROPONIN T SERPL-MCNC: <0.01 NG/ML — SIGNIFICANT CHANGE UP
WBC # BLD: 9.37 K/UL — SIGNIFICANT CHANGE UP (ref 4.8–10.8)
WBC # FLD AUTO: 9.37 K/UL — SIGNIFICANT CHANGE UP (ref 4.8–10.8)

## 2023-08-27 PROCEDURE — 71275 CT ANGIOGRAPHY CHEST: CPT | Mod: 26,MA

## 2023-08-27 PROCEDURE — 99223 1ST HOSP IP/OBS HIGH 75: CPT

## 2023-08-27 PROCEDURE — 93005 ELECTROCARDIOGRAM TRACING: CPT

## 2023-08-27 PROCEDURE — 70498 CT ANGIOGRAPHY NECK: CPT | Mod: 26,MA

## 2023-08-27 PROCEDURE — 84484 ASSAY OF TROPONIN QUANT: CPT

## 2023-08-27 PROCEDURE — 36415 COLL VENOUS BLD VENIPUNCTURE: CPT

## 2023-08-27 PROCEDURE — 82550 ASSAY OF CK (CPK): CPT

## 2023-08-27 PROCEDURE — 82962 GLUCOSE BLOOD TEST: CPT

## 2023-08-27 PROCEDURE — 70496 CT ANGIOGRAPHY HEAD: CPT | Mod: 26,MA

## 2023-08-27 PROCEDURE — 99291 CRITICAL CARE FIRST HOUR: CPT

## 2023-08-27 PROCEDURE — G0378: CPT

## 2023-08-27 PROCEDURE — 83605 ASSAY OF LACTIC ACID: CPT

## 2023-08-27 PROCEDURE — 70450 CT HEAD/BRAIN W/O DYE: CPT | Mod: 26,MA,59

## 2023-08-27 PROCEDURE — 82553 CREATINE MB FRACTION: CPT

## 2023-08-27 PROCEDURE — 74174 CTA ABD&PLVS W/CONTRAST: CPT | Mod: 26,MA

## 2023-08-27 PROCEDURE — 85027 COMPLETE CBC AUTOMATED: CPT

## 2023-08-27 PROCEDURE — 93010 ELECTROCARDIOGRAM REPORT: CPT

## 2023-08-27 PROCEDURE — 71045 X-RAY EXAM CHEST 1 VIEW: CPT | Mod: 26

## 2023-08-27 RX ORDER — SODIUM CHLORIDE 9 MG/ML
1000 INJECTION, SOLUTION INTRAVENOUS ONCE
Refills: 0 | Status: COMPLETED | OUTPATIENT
Start: 2023-08-27 | End: 2023-08-27

## 2023-08-27 RX ORDER — SODIUM CHLORIDE 9 MG/ML
1000 INJECTION, SOLUTION INTRAVENOUS
Refills: 0 | Status: DISCONTINUED | OUTPATIENT
Start: 2023-08-27 | End: 2023-08-28

## 2023-08-27 RX ADMIN — SODIUM CHLORIDE 1000 MILLILITER(S): 9 INJECTION, SOLUTION INTRAVENOUS at 14:31

## 2023-08-27 RX ADMIN — SODIUM CHLORIDE 1000 MILLILITER(S): 9 INJECTION, SOLUTION INTRAVENOUS at 16:34

## 2023-08-27 RX ADMIN — SODIUM CHLORIDE 60 MILLILITER(S): 9 INJECTION, SOLUTION INTRAVENOUS at 20:11

## 2023-08-27 NOTE — PATIENT PROFILE ADULT - IS THERE A SUSPICION OF ABUSE/NEGLIGENCE?
MS ADMITTING RN NOTES

RECEIVED PT FROM ER-ALERT AND ORIENTED X4.VERBALLY RESPONSIVE.AMBULATES WITH ASSIST.WITH LT 
FOOT WOUND DRESSING INTACT -CLEAN AND DRY.DENIES ANY PAIN OR DISTRESS.WITH BRP.WITH RT AC 
INTACT AND PATENT WITH ONGOING IVF OF NS AT 75 ML.HR INFUSING WELL.WAS A PT OF DR. MARÍA ANDINO(PODIATRIST)FOR 15 YRS.WAS SEEN BY DR CUI FOR ADMITTING ORDERS.CALL LIGHT PLACED 
WITHIN REACH. no yes

## 2023-08-27 NOTE — ED PROVIDER NOTE - PHYSICAL EXAMINATION
Physical Exam    Vital Signs: I have reviewed the initial vital signs.  Constitutional: well-nourished, appears stated age, no acute distress  Eyes: Conjunctiva pink, Sclera clear, PERRLA, EOMI.  Cardiovascular: S1 and S2, regular rate, regular rhythm, well-perfused extremities, radial pulses equal and 2+  Respiratory: unlabored respiratory effort, clear to auscultation bilaterally no wheezing, rales and rhonchi  Gastrointestinal: soft, non-tender abdomen, no pulsatile mass, normal bowl sounds  Musculoskeletal: supple neck, no lower extremity edema, no midline tenderness  Integumentary: warm, diaphoretic, no rash  Neurologic: awake, alert, cranial nerves II-XII grossly intact, extremities’ motor and sensory functions grossly intact  Psychiatric: appropriate mood, appropriate affect

## 2023-08-27 NOTE — ED PROVIDER NOTE - CLINICAL SUMMARY MEDICAL DECISION MAKING FREE TEXT BOX
52-year-old male presented to the ED for eval of syncopal episode. Labs and EKG were ordered and reviewed.  Imaging was ordered and reviewed by me.  Appropriate medications for patient's presenting complaints were ordered and effects were reassessed.  Patient's records (prior hospital, ED visit, and/or nursing home notes if available) were reviewed.  Additional history was obtained from EMS, family, and/or PCP (where available).  Escalation to admission/observation was considered. CT imaging negative for ICH or aortic dissection. Pt later endorsed smoking marijuana earlier today and using testosterone supplements. No clear etiology for syncope at this time. Patient requires inpatient hospitalization, will admit to low risk tele at this time for further care and workup.

## 2023-08-27 NOTE — ED PROVIDER NOTE - ATTENDING CONTRIBUTION TO CARE
51 yo M no reported PMHx presents to ED [facility] for near syncope. Pt reports him and wife were at [employer] earlier, his entire body went weak and he had diffuse nonspecific chest pain, lightheadedness and almost passed out, felt like "his body was giving out", prompting wife to call 911. Pt reports having multiple energy drinks earlier this morning. Pt has recent eval with PMD Andrade one month ago that was unremarkable. Pt reports lightheadedness at this time and "feeling off." Denies active chest pain, palpitations, SOB, vision changes, numbness, tingling, leg swelling, abdominal pain.      Vital Signs: I have reviewed the initial vital signs.  Constitutional: Patient in no acute distress.  Integumentary: No rash. (+) diaphoretic   ENT: dry MM, EOMI, PERRL   NECK: Supple.   Cardiovascular: RRR, radial pulses 2/4 bilaterally.   Respiratory: Breath sounds CTA b/l, no wheezing or crackles, good air exchange, good resp effort and excursion, no accessory muscle use, no stridor.  Gastrointestinal: Abdomen soft, non-tender, non-distended, no rebound tenderness or guarding, no CVAT.   Musculoskeletal: FROM, no edema, no calf pain/swelling/erythema.  Neurologic: AAOx3, motor 5/5 and sensation intact throughout upper and lower ext, CN II-XII intact, No facial droop or slurring of speech. No focal deficits. NIHSS 0.

## 2023-08-27 NOTE — ED PROVIDER NOTE - NSICDXPASTMEDICALHX_GEN_ALL_CORE_FT
PAST MEDICAL HISTORY:  Anxiety 5/17 "panic attack" to ed wk up neg cardio  cta on chart noted left lower lobe nodules    Pulmonary nodules lef tlower lobe dx 2017

## 2023-08-27 NOTE — H&P ADULT - NSHPLABSRESULTS_GEN_ALL_CORE
17.6   9.37  )-----------( 163      ( 27 Aug 2023 13:45 )             50.9       08-27    138  |  101  |  19  ----------------------------<  142<H>  4.3   |  26  |  1.2    Ca    9.5      27 Aug 2023 13:45  Mg     1.9     08-27    TPro  7.5  /  Alb  4.4  /  TBili  0.8  /  DBili  x   /  AST  27  /  ALT  31  /  AlkPhos  69  08-27              Urinalysis Basic - ( 27 Aug 2023 13:45 )    Color: x / Appearance: x / SG: x / pH: x  Gluc: 142 mg/dL / Ketone: x  / Bili: x / Urobili: x   Blood: x / Protein: x / Nitrite: x   Leuk Esterase: x / RBC: x / WBC x   Sq Epi: x / Non Sq Epi: x / Bacteria: x    Lactate Trend    CARDIAC MARKERS ( 27 Aug 2023 13:45 )  x     / <0.01 ng/mL / 289 U/L / x     / x            < from: CT Angio Abdomen and Pelvis w/ IV Cont (08.27.23 @ 15:37) >    IMPRESSION:    No evidence of aortic dissection. No CT evidence of acute intrathoracic   or abdominal pathology.    Mildly prominent mediastinal lymph nodes measuring up to 1.6 cm as   described above possibly reactive.    --- End of Report ---        NEHEMIAH LEVI MD; Resident Radiologist  This document has been electronically signed.  PEDRO PABLO GONZALEZ MD; Attending Radiologist  This document has been electronically signed. Aug 27 2023  4:42PM    < end of copied text >          POCT Blood Glucose.: 136 mg/dL (27 Aug 2023 13:47)

## 2023-08-27 NOTE — ED PROVIDER NOTE - OBJECTIVE STATEMENT
53 yo [facility] Pt reports him and wife were at [employer] earlier, his entire body went weak and he had diffuse nonspecific chest pain, lightheadedness and almost passed out, felt like "his body was giving out", prompting wife to call 911. Pt reports having multiple energy drinks earlier this morning. Pt has recent eval with PMD Lupe one month ago that was unremarkable. Pt reports lightheadedness at this time and "feeling off." Denies active chest pain, palpitations, SOB, vision changes, numbness, tingling, leg swelling, abdominal pain. Pt does admit to smoking a "drag of a joint of pot" before going to store today.

## 2023-08-27 NOTE — H&P ADULT - NSHPPHYSICALEXAM_GEN_ALL_CORE
GENERAL:  51y/o Male NAD, resting comfortably.  HEAD:  Atraumatic, Normocephalic  EYES: EOMI, PERRLA, conjunctiva and sclera clear  NECK: Supple, No JVD, no cervical lymphadenopathy, non-tender  CHEST/LUNG: Clear to auscultation bilaterally; No wheeze, rhonchi, or rales  HEART: Regular rate and rhythm; S1&S2  ABDOMEN: Soft, Nontender, Nondistended x 4 quadrants; Bowel sounds present  EXTREMITIES:   Peripheral Pulses Present, No clubbing, no cyanosis, or no edema, no calf tenderness  PSYCH: AAOx3, cooperative, appropriate  NEUROLOGY: WNL  SKIN: WNL

## 2023-08-27 NOTE — ED ADULT NURSE NOTE - NSFALLHARMRISKINTERV_ED_ALL_ED

## 2023-08-27 NOTE — ED PROVIDER NOTE - PROGRESS NOTE DETAILS
Authored by Elsa Diggs DO: Widened mediastinum on CXR, will proceed with CT dissection study. Authored by Elsa Diggs, DO: Pt lightheaded with exertion and attempted ambulation, CT angio head and neck obtained.

## 2023-08-27 NOTE — PATIENT PROFILE ADULT - FALL HARM RISK - HARM RISK INTERVENTIONS
Assistance with ambulation/Assistance OOB with selected safe patient handling equipment/Communicate Risk of Fall with Harm to all staff/Discuss with provider need for PT consult/Monitor gait and stability/Reinforce activity limits and safety measures with patient and family/Sit up slowly, dangle for a short time, stand at bedside before walking/Tailored Fall Risk Interventions/Visual Cue: Yellow wristband and red socks/Bed in lowest position, wheels locked, appropriate side rails in place/Call bell, personal items and telephone in reach/Instruct patient to call for assistance before getting out of bed or chair/Non-slip footwear when patient is out of bed/[place] to call system/Physically safe environment - no spills, clutter or unnecessary equipment/Purposeful Proactive Rounding/Room/bathroom lighting operational, light cord in reach

## 2023-08-27 NOTE — ED PROVIDER NOTE - CARE PLAN
Assessment and plan of treatment:	Plan - labs ct ekg monitor xr ivf reassess   Principal Discharge DX:	Chest pain  Assessment and plan of treatment:	Plan - labs ct ekg monitor xr ivf reassess  Secondary Diagnosis:	Near syncope   1

## 2023-08-27 NOTE — H&P ADULT - HISTORY OF PRESENT ILLNESS
53 y/o Male no reported PMHx presents to ED [facility] for near syncope. Pt reports him and wife were at [employer] earlier, his entire body went weak and he had diffuse nonspecific chest pain, lightheadedness and almost passed out, felt like "his body was giving out", prompting wife to call 911. Pt reports having multiple energy drinks earlier this morning. Pt has recent eval with PMD Lupe one month ago that was unremarkable. Pt reports lightheadedness at this time and "feeling off." Denies active chest pain, palpitations, SOB, vision changes, numbness, tingling, leg swelling, abdominal pain. Pt does admit to smoking a "drag of a joint of pot" before going to store today.  Pt also admits to taking Testosterone injection. 1 cc daily.

## 2023-08-27 NOTE — H&P ADULT - ASSESSMENT
This is a 53 y/o Male no reported PMHx presents to ED [facility] for near syncope. Pt reports him and wife were at [employer] earlier, his entire body went weak and he had diffuse nonspecific chest pain,    Admitted inpatient telemetry.    A/P:      # Chest Pain/Syncope  - admit inpatient telemetry  - orthostatic vitals  - cardiac enzymes  - Cardiology consult  - IVF  - EKG in am  - CT chest negative for dissection/PE  - Incidental Mildly prominent mediastinal lymph nodes including 1.6 cm right lower paratracheal (307-68) and 1.2 cm subcarinal (307-112), new since 4/4/2013  - outpatient f/u Dr Andrade for mediastinal lymph nodes    # Elevated lactate  - repeat in am  - repeat cbc in am  This is a 51 y/o Male no reported PMHx presents to ED [facility] for near syncope. Pt reports him and wife were at [employer] earlier, his entire body went weak and he had diffuse nonspecific chest pain,    Admitted inpatient telemetry.    A/P:      # Chest Pain/Syncope  - admit inpatient telemetry  - orthostatic vitals  - cardiac enzymes  - Cardiology consult  - IVF  - EKG in am  - CT chest negative for dissection/PE  - Incidental Mildly prominent mediastinal lymph nodes including 1.6 cm right lower paratracheal (307-68) and 1.2 cm subcarinal (307-112), new since 4/4/2013  - outpatient f/u Dr Andrade for mediastinal lymph nodes    # Elevated lactate  - repeat in am  - repeat cbc in am    #Possible exogenous testosterone use?  -Continue to monitor, may be causing presenting symptoms

## 2023-08-28 ENCOUNTER — TRANSCRIPTION ENCOUNTER (OUTPATIENT)
Age: 53
End: 2023-08-28

## 2023-08-28 LAB
CK MB CFR SERPL CALC: 2.6 NG/ML — SIGNIFICANT CHANGE UP (ref 0.6–6.3)
CK SERPL-CCNC: 155 U/L — SIGNIFICANT CHANGE UP (ref 0–225)
GLUCOSE BLDC GLUCOMTR-MCNC: 90 MG/DL — SIGNIFICANT CHANGE UP (ref 70–99)
HCT VFR BLD CALC: 47.9 % — SIGNIFICANT CHANGE UP (ref 42–52)
HGB BLD-MCNC: 16.4 G/DL — SIGNIFICANT CHANGE UP (ref 14–18)
LACTATE SERPL-SCNC: 1.3 MMOL/L — SIGNIFICANT CHANGE UP (ref 0.7–2)
MCHC RBC-ENTMCNC: 31.3 PG — HIGH (ref 27–31)
MCHC RBC-ENTMCNC: 34.2 G/DL — SIGNIFICANT CHANGE UP (ref 32–37)
MCV RBC AUTO: 91.4 FL — SIGNIFICANT CHANGE UP (ref 80–94)
NRBC # BLD: 0 /100 WBCS — SIGNIFICANT CHANGE UP (ref 0–0)
PLATELET # BLD AUTO: 146 K/UL — SIGNIFICANT CHANGE UP (ref 130–400)
PMV BLD: 12.1 FL — HIGH (ref 7.4–10.4)
RBC # BLD: 5.24 M/UL — SIGNIFICANT CHANGE UP (ref 4.7–6.1)
RBC # FLD: 13.3 % — SIGNIFICANT CHANGE UP (ref 11.5–14.5)
TROPONIN T SERPL-MCNC: <0.01 NG/ML — SIGNIFICANT CHANGE UP
WBC # BLD: 7.92 K/UL — SIGNIFICANT CHANGE UP (ref 4.8–10.8)
WBC # FLD AUTO: 7.92 K/UL — SIGNIFICANT CHANGE UP (ref 4.8–10.8)

## 2023-08-28 PROCEDURE — 93010 ELECTROCARDIOGRAM REPORT: CPT

## 2023-08-28 PROCEDURE — 99223 1ST HOSP IP/OBS HIGH 75: CPT

## 2023-08-28 PROCEDURE — 99232 SBSQ HOSP IP/OBS MODERATE 35: CPT

## 2023-08-28 NOTE — DISCHARGE NOTE PROVIDER - NSDCCPCAREPLAN_GEN_ALL_CORE_FT
PRINCIPAL DISCHARGE DIAGNOSIS  Diagnosis: Chest pain  Assessment and Plan of Treatment: You were admitted for syncope. Cardiology saw you and recommended a TTE but you did not wish to stay for it. You left AMA      SECONDARY DISCHARGE DIAGNOSES  Diagnosis: Near syncope  Assessment and Plan of Treatment:

## 2023-08-28 NOTE — CHART NOTE - NSCHARTNOTESELECT_GEN_ALL_CORE
AMA Gloria Maria MD, [address]            Reason for visit: Follow-up of hypothyroidism and thyroid nodules      ASSESSMENT AND PLAN:    1. Primary hypothyroidism  She is currently taking more Synthroid than prescribed, but she is biochemically euthyroid. Continue Synthroid as she is currently taking it. Return in 6 months with labs. - TSH; Future  - T4, Free; Future  - SYNTHROID 100 MCG tablet; 1 tablet daily 6 days/week and 2 tablets daily 1 day/week  Dispense: 113 tablet; Refill: 3    2. Hashimoto's thyroiditis    3. Multiple thyroid nodules  Ongoing ultrasound follow-up is not necessary. Follow-up and Dispositions    Return in about 6 months (around 6/5/2023). History of Present Illness:    THYROID DYSFUNCTION  Theron Horta is seen for follow-up of hypothyroidism; this was diagnosed in June 2019. Current symptoms: See review of systems below     Prior treatment: Levothyroxine 50 mcg daily was started 11/20/2021. She stopped it on her own ~2/4/2021.  75 mcg daily was resumed 2/16/2021. Her dose has been adjusted as follows:  -100 mcg daily 11/30/2021  -100 mcg daily 6 days/week and 200 mcg daily 1 day/week 9/15/2022     Pertinent labs:  6/11/2019: TSH 10.890, T4 5.8, free T3 2.7, antithyroid peroxidase antibodies 110 (+).  9/23/2020: TSH 10.000.  2/11/2021: TSH 8.520, free T4 0.83.  5/20/2021: TSH 2.240.  9/29/2021: TSH 10.100.  11/23/2021: TSH 13.000, free T4 0.87.  3/7/2022: TSH 1.360, free T4 1.37.  9/12/2022: TSH 5.770, free T4 0.9.  11/30/2022: TSH 0.579, free T4 0.9. Imaging:  10/22/2020: Ultrasound ([facility])- Right lobe 5.4 x 2.1 x 1.7 cm, isthmus 0.9 cm, left lobe 3.3 x 1.6 x 1.5 cm. Heterogeneous echotexture. Increased blood flow. 1.0 x 1.5 cm hypoechoic spongiform nodule in the right lobe. 0.6 x 0.8 cm hyperechoic spongiform nodule in the left lobe.   0.8 x 1.3 cm hypoechoic spongiform nodule in the left lobe.  Several smaller nodules elsewhere. 3/8/2022: Ultrasound ([facility])- Right lobe 1.84 x 1.90 x 5.96 cm, isthmus 0.71 cm, left lobe 2.00 x 1.47 x 5.09 cm. Heterogeneous echotexture. Increased blood flow. Pseudonodular changes without obvious discrete nodules. Review of Systems   Constitutional:  Positive for fatigue and unexpected weight change (previously gained 9 pounds in 6 months; intentionally lost 1 pound in the last 3 months). Cardiovascular:  Negative for palpitations. Gastrointestinal:  Negative for constipation and diarrhea. /84 (Site: Left Upper Arm, Position: Sitting)   Pulse 96   Wt 175 lb (79.4 kg)   SpO2 96%   BMI 32.53 kg/m²   Wt Readings from Last 3 Encounters:   12/05/22 175 lb (79.4 kg)   09/15/22 176 lb (79.8 kg)   03/31/22 167 lb (75.8 kg)       Physical Exam  HENT:      Head: Normocephalic. Neck:      Thyroid: No thyroid mass or thyromegaly. Cardiovascular:      Rate and Rhythm: Normal rate. Pulmonary:      Effort: No respiratory distress. Breath sounds: Normal breath sounds. Neurological:      Mental Status: She is alert. Psychiatric:         Mood and Affect: Mood normal.         Behavior: Behavior normal.       Orders Placed This Encounter   Procedures    TSH     Standing Status:   Future     Standing Expiration Date:   12/5/2023    T4, Free     Standing Status:   Future     Standing Expiration Date:   12/5/2023         Current Outpatient Medications   Medication Sig Dispense Refill    SYNTHROID 100 MCG tablet 1 tablet daily 6 days/week and 2 tablets daily 1 day/week 113 tablet 3    rosuvastatin (CRESTOR) 10 MG tablet Take 1 tablet by mouth daily OFFICE VISIT REQUIRED FOR FURTHER REFILLS 30 tablet 0    vitamin D (CHOLECALCIFEROL) 25 MCG (1000 UT) TABS tablet Take by mouth daily       No current facility-administered medications for this visit.        Ramin Frank MD, FACE      Portions of this note were generated with the assistance of voice recognition software. As such, some errors in transcription may be present.

## 2023-08-28 NOTE — CONSULT NOTE ADULT - SUBJECTIVE AND OBJECTIVE BOX
53 y/o with no pmh BIBEMS for near syncope. Pt reports multiple episodes of lightheadedness and nearly passing out, associated with vague chest discomfort. He admits having multiple energy drinks earlier in the morning. Pt also taking daily Testosterone injection. Per report h/o panic attack went to the ED and had normal cardiac workup. He otherwise very active, admits working a lot lately sometimes more than 80hrs/wk. Denies exertional chest pain, sob, palpitation, n/v, leg swelling, fever/chills.               PAST MEDICAL & SURGICAL HISTORY  Pulmonary nodules  lef tlower lobe dx 2017    Anxiety  5/17 "panic attack" to ed wk up neg cardio  cta on chart noted left lower lobe nodules    History of appendectomy  childhood    History of traumatic head injury        FAMILY HISTORY:  FAMILY HISTORY:  No pertinent family history in first degree relatives        SOCIAL HISTORY:  []smoker  []Alcohol  []Drug    ALLERGIES:  penicillin (Other)      MEDICATIONS:  MEDICATIONS  (STANDING):  lactated ringers. 1000 milliLiter(s) (60 mL/Hr) IV Continuous <Continuous>    MEDICATIONS  (PRN):      HOME MEDICATIONS:  Home Medications:      VITALS:   T(F): 97 (08-27 @ 19:45), Max: 97 (08-27 @ 19:45)  HR: 80 (08-27 @ 19:45) (80 - 87)  BP: 158/84 (08-27 @ 19:45) (151/86 - 158/84)  BP(mean): --  RR: 18 (08-27 @ 19:45) (16 - 18)  SpO2: 96% (08-27 @ 19:45) (96% - 99%)    I&O's Summary      REVIEW OF SYSTEMS:  CONSTITUTIONAL: No weakness, fevers or chills  EYES: No visual changes  ENT: No vertigo or throat pain   NECK: No pain or stiffness  RESPIRATORY: No cough, wheezing, hemoptysis; No shortness of breath  CARDIOVASCULAR: No chest pain or palpitations  GASTROINTESTINAL: No abdominal or epigastric pain. No nausea, vomiting, or hematemesis; No diarrhea or constipation. No melena or hematochezia.  GENITOURINARY: No dysuria, frequency or hematuria  NEUROLOGICAL: No numbness or weakness  SKIN: No itching, no rashes  MSK: no    PHYSICAL EXAM:  NEURO: patient is awake , alert and oriented  GEN: Not in acute distress  NECK: no thyroid enlargement, no JVD  LUNGS: Clear to auscultation bilaterally   CARDIOVASCULAR: S1/S2 present, RRR , no murmurs or rubs, no carotid bruits,  + PP bilaterally  ABD: Soft, non-tender, non-distended, +BS  EXT: No ELIZABETH  SKIN: Intact    LABS:                        16.4   7.92  )-----------( 146      ( 28 Aug 2023 06:11 )             47.9     08-27    138  |  101  |  19  ----------------------------<  142<H>  4.3   |  26  |  1.2    Ca    9.5      27 Aug 2023 13:45  Mg     1.9     08-27    TPro  7.5  /  Alb  4.4  /  TBili  0.8  /  DBili  x   /  AST  27  /  ALT  31  /  AlkPhos  69  08-27      Troponin T, Serum: <0.01 ng/mL (08-28-23 @ 06:11)  Lactate, Blood: 1.3 mmol/L (08-28-23 @ 06:11)  Creatine Kinase, Serum: 203 U/L (08-27-23 @ 21:39)  Troponin T, Serum: <0.01 ng/mL (08-27-23 @ 21:39)  Troponin T, Serum: <0.01 ng/mL (08-27-23 @ 13:45)  Creatine Kinase, Serum: 289 U/L *H* (08-27-23 @ 13:45)    CARDIAC MARKERS ( 28 Aug 2023 06:11 )  x     / <0.01 ng/mL / x     / x     / 2.6 ng/mL  CARDIAC MARKERS ( 27 Aug 2023 21:39 )  x     / <0.01 ng/mL / 203 U/L / x     / 3.2 ng/mL  CARDIAC MARKERS ( 27 Aug 2023 13:45 )  x     / <0.01 ng/mL / 289 U/L / x     / x            Troponin trend:            RADIOLOGY:  -CXR:  -TTE:  -CCTA:  -STRESS TEST:  -CATHETERIZATION:    ECG:    TELEMETRY EVENTS:   51 y/o with no pmh BIBEMS for near syncope. Pt reports multiple episodes of lightheadedness and nearly passing out, associated with vague chest discomfort. He admits having multiple energy drinks earlier in the morning. Pt also taking daily Testosterone injection. Per report h/o panic attack went to the ED and had normal cardiac workup. He otherwise very active, admits working a lot lately sometimes more than 80hrs/wk. Denies exertional chest pain, sob, palpitation, n/v, leg swelling, fever/chills.               PAST MEDICAL & SURGICAL HISTORY  Pulmonary nodules  lef tlower lobe dx 2017    Anxiety  5/17 "panic attack" to ed wk up neg cardio  cta on chart noted left lower lobe nodules    History of appendectomy  childhood    History of traumatic head injury        FAMILY HISTORY:  FAMILY HISTORY:  No pertinent family history in first degree relatives        SOCIAL HISTORY:  Denies but other documentation shows marijuana use.   []smoker  []Alcohol  []Drug    ALLERGIES:  penicillin (Other)      MEDICATIONS:  MEDICATIONS  (STANDING):  lactated ringers. 1000 milliLiter(s) (60 mL/Hr) IV Continuous <Continuous>    MEDICATIONS  (PRN):      HOME MEDICATIONS:  Home Medications:      VITALS:   T(F): 97 (08-27 @ 19:45), Max: 97 (08-27 @ 19:45)  HR: 80 (08-27 @ 19:45) (80 - 87)  BP: 158/84 (08-27 @ 19:45) (151/86 - 158/84)  BP(mean): --  RR: 18 (08-27 @ 19:45) (16 - 18)  SpO2: 96% (08-27 @ 19:45) (96% - 99%)    I&O's Summary      REVIEW OF SYSTEMS:  CONSTITUTIONAL: No weakness, fevers or chills  EYES: No visual changes  ENT: No vertigo or throat pain   NECK: No pain or stiffness  RESPIRATORY: No cough, wheezing, hemoptysis; No shortness of breath  CARDIOVASCULAR: No chest pain or palpitations  GASTROINTESTINAL: No abdominal or epigastric pain. No nausea, vomiting, or hematemesis; No diarrhea or constipation. No melena or hematochezia.  GENITOURINARY: No dysuria, frequency or hematuria  NEUROLOGICAL: No numbness or weakness. +near syncope.   SKIN: No itching, no rashes  MSK: no    PHYSICAL EXAM:  NEURO: patient is awake , alert and oriented  GEN: Not in acute distress  NECK: no thyroid enlargement, no JVD  LUNGS: Clear to auscultation bilaterally   CARDIOVASCULAR: S1/S2 present, RRR , no murmurs or rubs, no carotid bruits,  + PP bilaterally  ABD: Soft, non-tender, non-distended, +BS  EXT: No ELIZABETH  SKIN: Intact    LABS:                        16.4   7.92  )-----------( 146      ( 28 Aug 2023 06:11 )             47.9     08-27    138  |  101  |  19  ----------------------------<  142<H>  4.3   |  26  |  1.2    Ca    9.5      27 Aug 2023 13:45  Mg     1.9     08-27    TPro  7.5  /  Alb  4.4  /  TBili  0.8  /  DBili  x   /  AST  27  /  ALT  31  /  AlkPhos  69  08-27      Troponin T, Serum: <0.01 ng/mL (08-28-23 @ 06:11)  Lactate, Blood: 1.3 mmol/L (08-28-23 @ 06:11)  Creatine Kinase, Serum: 203 U/L (08-27-23 @ 21:39)  Troponin T, Serum: <0.01 ng/mL (08-27-23 @ 21:39)  Troponin T, Serum: <0.01 ng/mL (08-27-23 @ 13:45)  Creatine Kinase, Serum: 289 U/L *H* (08-27-23 @ 13:45)    CARDIAC MARKERS ( 28 Aug 2023 06:11 )  x     / <0.01 ng/mL / x     / x     / 2.6 ng/mL  CARDIAC MARKERS ( 27 Aug 2023 21:39 )  x     / <0.01 ng/mL / 203 U/L / x     / 3.2 ng/mL  CARDIAC MARKERS ( 27 Aug 2023 13:45 )  x     / <0.01 ng/mL / 289 U/L / x     / x            Troponin trend:            RADIOLOGY:  -CXR: < from: Xray Chest 1 View- PORTABLE-Urgent (08.27.23 @ 14:37) >    Impression:    Incomplete inspiration with magnified cardiac silhouette.    No consolidation, effusion or pneumothorax.    < end of copied text >    -TTE:  -CT: < from: CT Angio Chest Aorta w/wo IV Cont (08.27.23 @ 15:37) >  IMPRESSION:    No evidence of aortic dissection. No CT evidence of acute intrathoracic   or abdominal pathology.    Mildly prominent mediastinal lymph nodes measuring up to 1.6 cm as   described above possibly reactive.    < end of copied text >    -STRESS TEST:  -CATHETERIZATION:    ECG: < from: 12 Lead ECG (08.28.23 @ 07:44) >    Diagnosis Line Normal sinus rhythm  Left anterior fascicular block  Poor R wave progression      < end of copied text >      TELEMETRY EVENTS: Sinus judah while sleeping.

## 2023-08-28 NOTE — CONSULT NOTE ADULT - ASSESSMENT
51 y/o with no pmh BIBEMS for near syncope and chest discomfort.    Impression  # near syncope  # Atypical chest pain     Plan  - Currently hd stable, chest pain free, denies sob  - obtain TTE  - check ortho vital  - avoid Dehydration  - if no acute abnormality on TTE then stable for dc from cardiac standpoint  - need Lifestyle modification, stress management    Discussed w/ Dr Vargas

## 2023-08-28 NOTE — CONSULT NOTE ADULT - NS ATTEND AMEND GEN_ALL_CORE FT
Patient seen and examined. Pertinent labs, imaging and telemetry reviewed. I agree with the above:     Patient feeling well. He had been working outdoors yesterday and had multiple energy drinks. He drank some water. Near syncope while driving. Denies LOC. He had some chest pressure at that time. He only had this once before previously and was diagnosed with panic attack. Denies further CP, SOB, palpitations. He is active, goes to the gym often without symptoms.   EKG normal with no tele events. Negative troponins.   -Likely orthostatic or vasovagal in nature given dehydration.   -Check TTE.   -If no structural abnormalities, no further inpatient cardiac work up needed.   -Can follow with cardiology as outpatient.     Discussed with patient and ACP.

## 2023-08-28 NOTE — DISCHARGE NOTE PROVIDER - HOSPITAL COURSE
53 y/o Male no reported PMHx presents to ED [facility] for near syncope. Pt reports him and wife were at [employer] earlier, his entire body went weak and he had diffuse nonspecific chest pain, lightheadedness and almost passed out, felt like "his body was giving out", prompting wife to call 911. Pt reports having multiple energy drinks earlier this morning. Pt has recent eval with PMD Lupe one month ago that was unremarkable. Pt reports lightheadedness at this time and "feeling off." Denies active chest pain, palpitations, SOB, vision changes, numbness, tingling, leg swelling, abdominal pain. Pt does admit to smoking a "drag of a joint of pot" before going to store today. Pt also admits to taking Testosterone injection. 1 cc daily. While he was here, cardiology saw him and recommended TTE but patient did not wish to stay and requested to leave AMA. AMA forms signed and placed in chart.

## 2023-08-28 NOTE — CHART NOTE - NSCHARTNOTEFT_GEN_A_CORE
I received notice that patient would like to leave against medical advice. I had an extensive discussion of risks and benefits of pursuing further medical evaluation and/or care with patient and any available family/friends; patient still electing to leave against medical advice. Patient is awake, alert, oriented and demonstrates full capacity and insight into illness. Patient aware and encouraged to return immediately to ED or nearest ED if patient decides to change mind regarding care or if patient experiences any new, worsening, or concerning symptoms

## 2023-08-28 NOTE — DISCHARGE NOTE PROVIDER - CARE PROVIDER_API CALL
Augustus Chahal  Cardiovascular Disease  [address] [place] [id number]  Phone: [phone]  Fax: [phone]  Follow Up Time:    Derian Vargas  Cardiovascular Disease  [address] [place] [id number]  Phone: [phone]  Fax: [phone]  Follow Up Time:

## 2023-09-01 DIAGNOSIS — R07.89 OTHER CHEST PAIN: ICD-10-CM

## 2023-09-01 DIAGNOSIS — R53.1 WEAKNESS: ICD-10-CM

## 2023-09-01 DIAGNOSIS — Z88.0 ALLERGY STATUS TO PENICILLIN: ICD-10-CM

## 2023-09-01 DIAGNOSIS — E86.0 DEHYDRATION: ICD-10-CM

## 2023-09-01 DIAGNOSIS — Z90.49 ACQUIRED ABSENCE OF OTHER SPECIFIED PARTS OF DIGESTIVE TRACT: ICD-10-CM

## 2023-09-01 DIAGNOSIS — R55 SYNCOPE AND COLLAPSE: ICD-10-CM

## 2024-08-29 ENCOUNTER — APPOINTMENT (OUTPATIENT)
Dept: ORTHOPEDIC SURGERY | Facility: CLINIC | Age: 54
End: 2024-08-29

## 2024-08-29 DIAGNOSIS — S29.011A STRAIN OF MUSCLE AND TENDON OF FRONT WALL OF THORAX, INITIAL ENCOUNTER: ICD-10-CM

## 2024-08-29 PROCEDURE — 99204 OFFICE O/P NEW MOD 45 MIN: CPT

## 2024-08-30 VITALS — WEIGHT: 220 LBS | HEIGHT: 69 IN | BODY MASS INDEX: 32.58 KG/M2

## 2024-09-01 ENCOUNTER — OUTPATIENT (OUTPATIENT)
Dept: OUTPATIENT SERVICES | Facility: HOSPITAL | Age: 54
LOS: 1 days | End: 2024-09-01
Payer: COMMERCIAL

## 2024-09-01 DIAGNOSIS — Z87.828 PERSONAL HISTORY OF OTHER (HEALED) PHYSICAL INJURY AND TRAUMA: Chronic | ICD-10-CM

## 2024-09-01 DIAGNOSIS — Z90.49 ACQUIRED ABSENCE OF OTHER SPECIFIED PARTS OF DIGESTIVE TRACT: Chronic | ICD-10-CM

## 2024-09-01 DIAGNOSIS — R07.9 CHEST PAIN, UNSPECIFIED: ICD-10-CM

## 2024-09-01 DIAGNOSIS — Z00.8 ENCOUNTER FOR OTHER GENERAL EXAMINATION: ICD-10-CM

## 2024-09-01 PROCEDURE — 71550 MRI CHEST W/O DYE: CPT | Mod: 26

## 2024-09-01 PROCEDURE — 71550 MRI CHEST W/O DYE: CPT

## 2024-09-02 DIAGNOSIS — R07.9 CHEST PAIN, UNSPECIFIED: ICD-10-CM

## 2024-09-03 NOTE — ASU PATIENT PROFILE, ADULT - NSICDXPASTSURGICALHX_GEN_ALL_CORE_FT
PAST SURGICAL HISTORY:  History of repair of pectoralis muscle tear     History of traumatic head injury

## 2024-09-03 NOTE — ASU PATIENT PROFILE, ADULT - FALL HARM RISK - UNIVERSAL INTERVENTIONS
Bed in lowest position, wheels locked, appropriate side rails in place/Call bell, personal items and telephone in reach/Instruct patient to call for assistance before getting out of bed or chair/Non-slip footwear when patient is out of bed/[place] to call system/Physically safe environment - no spills, clutter or unnecessary equipment/Purposeful Proactive Rounding/Room/bathroom lighting operational, light cord in reach

## 2024-09-04 ENCOUNTER — TRANSCRIPTION ENCOUNTER (OUTPATIENT)
Age: 54
End: 2024-09-04

## 2024-09-04 ENCOUNTER — OUTPATIENT (OUTPATIENT)
Dept: OUTPATIENT SERVICES | Facility: HOSPITAL | Age: 54
LOS: 1 days | Discharge: HOME | End: 2024-09-04
Payer: COMMERCIAL

## 2024-09-04 ENCOUNTER — APPOINTMENT (OUTPATIENT)
Dept: ORTHOPEDIC SURGERY | Facility: AMBULATORY SURGERY CENTER | Age: 54
End: 2024-09-04

## 2024-09-04 ENCOUNTER — EMERGENCY (EMERGENCY)
Facility: HOSPITAL | Age: 54
LOS: 0 days | Discharge: HOME | End: 2024-09-04
Attending: EMERGENCY MEDICINE
Payer: COMMERCIAL

## 2024-09-04 VITALS
SYSTOLIC BLOOD PRESSURE: 126 MMHG | HEART RATE: 98 BPM | TEMPERATURE: 98 F | DIASTOLIC BLOOD PRESSURE: 82 MMHG | WEIGHT: 259.93 LBS | RESPIRATION RATE: 18 BRPM | OXYGEN SATURATION: 99 % | HEIGHT: 69 IN

## 2024-09-04 VITALS
RESPIRATION RATE: 20 BRPM | DIASTOLIC BLOOD PRESSURE: 74 MMHG | OXYGEN SATURATION: 94 % | SYSTOLIC BLOOD PRESSURE: 140 MMHG | HEART RATE: 88 BPM

## 2024-09-04 VITALS
DIASTOLIC BLOOD PRESSURE: 91 MMHG | SYSTOLIC BLOOD PRESSURE: 148 MMHG | OXYGEN SATURATION: 98 % | HEIGHT: 69 IN | TEMPERATURE: 98 F | WEIGHT: 225.09 LBS | HEART RATE: 64 BPM | RESPIRATION RATE: 20 BRPM

## 2024-09-04 VITALS
HEART RATE: 98 BPM | OXYGEN SATURATION: 95 % | TEMPERATURE: 98 F | RESPIRATION RATE: 20 BRPM | SYSTOLIC BLOOD PRESSURE: 126 MMHG | DIASTOLIC BLOOD PRESSURE: 82 MMHG

## 2024-09-04 DIAGNOSIS — S29.011A STRAIN OF MUSCLE AND TENDON OF FRONT WALL OF THORAX, INITIAL ENCOUNTER: ICD-10-CM

## 2024-09-04 DIAGNOSIS — Z87.828 PERSONAL HISTORY OF OTHER (HEALED) PHYSICAL INJURY AND TRAUMA: Chronic | ICD-10-CM

## 2024-09-04 DIAGNOSIS — Z98.890 OTHER SPECIFIED POSTPROCEDURAL STATES: Chronic | ICD-10-CM
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
## 2024-09-04 DIAGNOSIS — Z90.49 ACQUIRED ABSENCE OF OTHER SPECIFIED PARTS OF DIGESTIVE TRACT: Chronic | ICD-10-CM

## 2024-09-04 DIAGNOSIS — Z88.0 ALLERGY STATUS TO PENICILLIN: ICD-10-CM
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
## 2024-09-04 PROCEDURE — 99283 EMERGENCY DEPT VISIT LOW MDM: CPT | Mod: 25

## 2024-09-04 PROCEDURE — 93005 ELECTROCARDIOGRAM TRACING: CPT

## 2024-09-04 PROCEDURE — 24341 RPR TDN/MUSC UPR A/E EACH: CPT | Mod: LT

## 2024-09-04 PROCEDURE — 93010 ELECTROCARDIOGRAM REPORT: CPT

## 2024-09-04 PROCEDURE — 36000 PLACE NEEDLE IN VEIN: CPT

## 2024-09-04 PROCEDURE — 82962 GLUCOSE BLOOD TEST: CPT

## 2024-09-04 PROCEDURE — 99284 EMERGENCY DEPT VISIT MOD MDM: CPT

## 2024-09-04 RX ORDER — HYDROMORPHONE HYDROCHLORIDE 2 MG/1
0.5 TABLET ORAL
Refills: 0 | Status: DISCONTINUED | OUTPATIENT
Start: 2024-09-04 | End: 2024-09-04

## 2024-09-04 RX ORDER — ACETAMINOPHEN 325 MG/1
975 TABLET ORAL ONCE
Refills: 0 | Status: DISCONTINUED | OUTPATIENT
Start: 2024-09-04 | End: 2024-09-04

## 2024-09-04 RX ORDER — ACETAMINOPHEN 325 MG/1
1000 TABLET ORAL ONCE
Refills: 0 | Status: COMPLETED | OUTPATIENT
Start: 2024-09-04 | End: 2024-09-04

## 2024-09-04 RX ORDER — ONDANSETRON 2 MG/ML
4 INJECTION, SOLUTION INTRAMUSCULAR; INTRAVENOUS ONCE
Refills: 0 | Status: DISCONTINUED | OUTPATIENT
Start: 2024-09-04 | End: 2024-09-04

## 2024-09-04 RX ORDER — KETOROLAC TROMETHAMINE 30 MG/ML
30 INJECTION, SOLUTION INTRAMUSCULAR ONCE
Refills: 0 | Status: DISCONTINUED | OUTPATIENT
Start: 2024-09-04 | End: 2024-09-04

## 2024-09-04 RX ORDER — TRAMADOL HYDROCHLORIDE 200 MG/1
1 TABLET, EXTENDED RELEASE ORAL
Qty: 12 | Refills: 0
Start: 2024-09-04 | End: 2024-09-06

## 2024-09-04 RX ORDER — OXYCODONE HYDROCHLORIDE 5 MG/1
5 TABLET ORAL ONCE
Refills: 0 | Status: DISCONTINUED | OUTPATIENT
Start: 2024-09-04 | End: 2024-09-04

## 2024-09-04 RX ORDER — HYDROMORPHONE HYDROCHLORIDE 2 MG/1
1 TABLET ORAL
Refills: 0 | Status: DISCONTINUED | OUTPATIENT
Start: 2024-09-04 | End: 2024-09-04

## 2024-09-04 RX ADMIN — Medication 100 MILLILITER(S): at 13:29

## 2024-09-04 RX ADMIN — KETOROLAC TROMETHAMINE 30 MILLIGRAM(S): 30 INJECTION, SOLUTION INTRAMUSCULAR at 15:09

## 2024-09-04 RX ADMIN — ACETAMINOPHEN 400 MILLIGRAM(S): 325 TABLET ORAL at 15:12

## 2024-09-04 RX ADMIN — HYDROMORPHONE HYDROCHLORIDE 0.5 MILLIGRAM(S): 2 TABLET ORAL at 14:07

## 2024-09-04 NOTE — ED PROVIDER NOTE - PATIENT PORTAL LINK FT
You can access the FollowMyHealth Patient Portal offered by [facility] by registering at the following website: [email or web address]. By joining [employer]’s FollowMyHealth portal, you will also be able to view your health information using other applications (apps) compatible with our system.

## 2024-09-04 NOTE — ED PROVIDER NOTE - PHYSICAL EXAMINATION
VITAL SIGNS: I have reviewed nursing notes and confirm.  CONSTITUTIONAL: Well-developed; well-nourished; in no acute distress.  SKIN: Skin exam is warm and dry, no acute rash.  HEAD: Normocephalic; atraumatic.  EYES: PERRL, EOM intact; conjunctiva and sclera clear.  ENT: airway clear.   NECK: Supple  CARD: S1, S2 normal; no murmurs, gallops, or rubs. Regular rate and rhythm.  CHEST: Patient in a left shoulder sling/immobilizer. Left chest in surgical dressing, no active bleeding.  RESP: No wheezes, rales or rhonchi.  ABD: Normal bowel sounds; soft; non-distended; non-tender  EXT: Normal ROM.   NEURO: Alert, oriented. Gait stable.  PSYCH: Cooperative, appropriate.

## 2024-09-04 NOTE — ED PROVIDER NOTE - CARE PROVIDER_API CALL
Charbel Andrade  Internal Medicine  [address] [place] [address]  Phone: [phone]  Fax: [phone]  Follow Up Time: 7-10 Days

## 2024-09-04 NOTE — ED PROVIDER NOTE - ATTENDING CONTRIBUTION TO CARE
53-year-old male without any patient past medical history presenting to ED following a syncopal episode.  Of note, patient underwent ambulatory repair of his left pectoral muscle, surgery was over at 4 PM, on his way home he stopped at [employer], ate several burgers, drank Gatorade.  Upon returning home he was walking in his backyard, started feeling a little lightheaded, sweaty, dark and follow his eyes, he felt like passing out and sat down on a recliner.  Family found him passed out he was clammy and ashen, they called 911.  Patient regained consciousness when 911 arrived.  At present feels asymptomatic.  Denies having any chest pain, shortness of breath. He is active, exercises frequently, no history of heart disease. He is well-nourished, well-appearing, lungs CTA b/l, , RRR, well-perfused extremities, there is bruising of the left chest wall, surgical bandages/dressing is in place, extremities are neurovascularly intact, patient awake and alert x 3, no leg edema or unilateral calf swelling, no gross neurodeficits, normal affect.

## 2024-09-04 NOTE — ED PROVIDER NOTE - OBJECTIVE STATEMENT
53-year-old male with no past medical history, status post left pectoralis major muscle repair earlier today presents ED status post syncopal event.  Patient states that he was n.p.o. the whole day and around 4 PM underwent the pectoralis repair.  Was discharged home.  Patient states that he 86, nontender  Calcium bilateral stent pedal but when he went home as well.  Patient states that he was walking in the house when he felt lightheaded, feel like he was going to pass out.  He sat onto the couch and his family noticed that he passed out, states he was out for approximately 5 to 7 minutes.  Patient brought to the ED for further evaluation.  In ED, patient states that he feels better.  He denies any dizziness now.  He denied any chest pain, difficulty breathing, or headache prior to the event.  Patient states that he did have some pain in the left side of his chest, where the surgery was.  He states that he is feeling back to baseline now.

## 2024-09-04 NOTE — ASU DISCHARGE PLAN (ADULT/PEDIATRIC) - ASU DC SPECIAL INSTRUCTIONSFT
Post Operative Instructions for Shoulder Surgery    Your Surgery Included  [ ] Rotator Cuff Repair			  [ ] Debridement				  [ ] Biceps Tenodesis/Tenotomy	  [ ] Distal Clavicle Resection		  [ ] SLAP Repair  [ ] Instability Repair  [ ] Lysis of Adhesions/Manipulation  [x ] Other: Pec Major Repair  	  Call our office ([phone]) immediately if you experience any of the following:  •	Excessive bleeding or pus like drainage at the incision site  •	Uncontrollable pain not relieved by pain medication  •	Excessive swelling or redness at the incision site  •	Fever above 101.5 degrees not controlled with Tylenol or Motrin  •	Shortness of Breath  •	Any foul odor or blistering from the surgery site    Pain Management: You were given one or more of the following medication prescriptions before leaving the hospital. Have the prescriptions filled at a pharmacy on your way home and follow the instructions on the bottles.   Regional Anesthesia Injections (Blocks): You may have been given a regional nerve block either before or after surgery. This may numb your shoulder for 24-36 hours    Diet: Eat a bland diet for the first day after surgery. Progress your diet as tolerated. Constipation may occur with Narcotic usage, contact our office if you are experiencing constipation.    Activity: After you arrive at home, spend most of the first 24 hours resting in bed, on the couch, or in a reclining chair. After the first 24 hours at home, slowly increase your activity level based on your symptoms.    Dressing Change: Remove the dressing on the 3rd day. It is normal for some blood to be seen on the dressings. It is also normal for you to see apparent bruising on the skin around your shoulder when you remove the dressing. If present, leave the steri-strip tape across the incisions. If you are concerned by the drainage or the appearance of your shoulder, please call one of the numbers listed below. Keep incisions covered with Band-Aids/bandages.    Showering: You may shower on the 5th day after surgery if the wound is dry and clean, but do not let the wound soak in water until sutures are removed. Do not submerge in any water until after your postoperative appointment in clinic.    Shoulder Sling: You may have been sent home with a sling / pillow attachment holding your arm away from your body. You may remove the sling when changing clothes or bathing. Make sure to wear the sling while sleeping unless instructed otherwise. You may remove for exercises.    Shoulder Exercises: You may do these exercises for 2-5 mins five times a day in order to help regain your range of motion.  [ ] Shoulder Shrugs: Shrug your shoulders up and down  [ ] Pendulums: Bend forward allowing your arm to hang down in front of you. Gently swing your arm side-to-side and front to back  [x ] Elbow range of motion: Straighten and bend your elbow  [ ] Scapula Retractions: Squeeze shoulder blades together while slightly pulling them down  [ ] Passive Abduction: Have family member lift your arm away from your body bringing your elbow to the level of your shoulder  [ ] Shoulder rotation: With your arm at your side, have a family member rotate your arm internally and externally  [ ] Pulley exercises: Put a towel over the top of a door and face the door, use your good arm to pull your arm up in front of you    Follow Up: As Scheduled

## 2024-09-04 NOTE — ASU DISCHARGE PLAN (ADULT/PEDIATRIC) - NS MD DC FALL RISK RISK
For information on Fall & Injury Prevention, visit: [email or web address] OR  [email or web address] OR  https://www.cdc.gov/steadi/patient.html

## 2024-09-04 NOTE — ED ADULT NURSE NOTE - CHIEF COMPLAINT QUOTE
Pt BIBA, had surgery to repair L whitmore muscle. Pt was found unconscious for 10min and diaphoretic at 8pm by family. Cleared by crit MD Brothers

## 2024-09-04 NOTE — ASU DISCHARGE PLAN (ADULT/PEDIATRIC) - CARE PROVIDER_API CALL
Jose M Machuca  Orthopaedic Surgery  [address] [place] [address]  Phone: [phone]  Fax: [phone]  Follow Up Time:

## 2024-09-04 NOTE — ED PROVIDER NOTE - CLINICAL SUMMARY MEDICAL DECISION MAKING FREE TEXT BOX
53-year-old male with most likely vasovagal syncope.  Vital signs are normal.  Patient appears very well, neurologically intact.  Does not have any complaints.  EKG is unchanged.  Patient declined any further testing in the ED, wishes to go home, has an appointment with his PCP on 9/16.  Anticipatory guidance was provided, patient reports instructed to return to emergency room immediately for any worsening/concerning symptoms.  Collateral information obtained from the patient's brother-in-law.  Both verbalized understanding and are amenable with the plan

## 2024-09-04 NOTE — ED ADULT NURSE NOTE - NSFALLUNIVINTERV_ED_ALL_ED
Bed/Stretcher in lowest position, wheels locked, appropriate side rails in place/Call bell, personal items and telephone in reach/Instruct patient to call for assistance before getting out of bed/chair/stretcher/Non-slip footwear applied when patient is off stretcher/[place] to call system/Physically safe environment - no spills, clutter or unnecessary equipment/Purposeful proactive rounding/Room/bathroom lighting operational, light cord in reach

## 2024-09-04 NOTE — ED ADULT TRIAGE NOTE - AS TEMP SITE
Subjective   Jorge Alberto Schmitz is a 59 y.o. male.     History of Present Illness     Chief Complaint:   Chief Complaint   Patient presents with   • Insomnia     [facility]   • Allergies   • Nasal Congestion   • Sinusitis     5/10/2023 multiple pharm        Jorge Alberto Schmitz 59 y.o. male who presents today for Medical Management of the below listed issues. He  has a problem list of   Patient Active Problem List   Diagnosis   • DDD (degenerative disc disease), cervical   • Hyperlipidemia   • Essential hypertension   • Hypothyroidism   • Insomnia   • Hypogonadotropic hypogonadism   • Erectile dysfunction   • Abnormal weight gain   • Chronic pain   • Postlaminectomy syndrome, cervical region   • Cervical radiculitis   • Encounter for long-term (current) use of high-risk medication   • Muscle pain   • Chronic nonseasonal allergic rhinitis due to pollen   .  Since the last visit, He has overall felt well and recovered well from his recent URI, but now has a 3 day recurrence of increased cough/congestion/pressure.    he has been compliant with   Current Outpatient Medications:   •  benzonatate (TESSALON) 200 MG capsule, Take 1 capsule by mouth 3 (Three) Times a Day As Needed for Cough., Disp: 30 capsule, Rfl: 5  •  tadalafil (Cialis) 20 MG tablet, Take 1 tablet by mouth Daily As Needed for Erectile Dysfunction., Disp: 30 tablet, Rfl: 5  •  zolpidem (Ambien) 10 MG tablet, Take 1 tablet by mouth At Night As Needed for Sleep. GENERIC BRAND ONLY  ., Disp: 90 tablet, Rfl: 0  •  Acetylcysteine (N-Acetyl-L-Cysteine) 600 MG capsule, Take 1 capsule by mouth 2 (Two) Times a Day., Disp: 180 capsule, Rfl: 3  •  amlodipine-olmesartan (ALVERTO) 10-40 MG per tablet, Take 1 tablet by mouth Daily., Disp: 90 tablet, Rfl: 1  •  cyclobenzaprine (FLEXERIL) 10 MG tablet, Take 1 tablet by mouth 3 (Three) Times a Day As Needed for Muscle Spasms., Disp: 180 tablet, Rfl: 1  •  doxycycline (VIBRAMYICN) 100 MG tablet, Take 1 tablet by mouth 2 (Two) Times a  "Day., Disp: 20 tablet, Rfl: 0  •  fexofenadine (ALLEGRA) 180 MG tablet, Take 1 tablet by mouth Daily., Disp: , Rfl:   •  HYDROcodone-acetaminophen (NORCO) 5-325 MG per tablet, Take 1 tablet by mouth 2 (Two) Times a Day As Needed for Severe Pain. 30 day supply, Disp: 60 tablet, Rfl: 0  •  levothyroxine (SYNTHROID, LEVOTHROID) 112 MCG tablet, Take 1 tablet by mouth Daily., Disp: 90 tablet, Rfl: 1  •  methylPREDNISolone (Medrol) 4 MG dose pack, follow package directions, Disp: 21 tablet, Rfl: 0  •  pravastatin (PRAVACHOL) 20 MG tablet, Take 1 tablet by mouth Daily., Disp: 90 tablet, Rfl: 0  •  vitamin D3 125 MCG (5000 UT) capsule capsule, Take 1 capsule by mouth Daily., Disp: , Rfl: .  He denies medication side effects.    All of the other chronic condition(s) listed above are stable w/o issues.    /79   Pulse 107   Temp 98.1 °F (36.7 °C) (Oral)   Resp 16   Ht 175.3 cm (69.02\")   Wt 99.8 kg (220 lb)   SpO2 97%   BMI 32.47 kg/m²     Results for orders placed or performed in visit on 03/30/23   POC Urine Drug Screen, Triage    Specimen: Urine   Result Value Ref Range    Methamphetaine Screen, Urine Negative Negative    POC Amphetamines Negative Negative    Barbiturates Screen Negative Negative    Benzodiazepine Screen Negative Negative    Cocaine Screen Negative Negative    Methadone Screen Negative Negative    Opiate Screen Positive Negative    Oxycodone, Screen Negative Negative    Phencyclidine (PCP) Screen Negative Negative    Propoxyphene Screen Negative Negative    THC, Screen Negative Negative    Tricyclic Antidepressants Screen Negative Negative             The following portions of the patient's history were reviewed and updated as appropriate: allergies, current medications, past family history, past medical history, past social history, past surgical history, and problem list.    Review of Systems   Constitutional: Negative for activity change, chills and fever.   Respiratory: Negative for cough.  "   Cardiovascular: Negative for chest pain.   Psychiatric/Behavioral: Negative for dysphoric mood.       Objective   Physical Exam  Constitutional:       General: He is not in acute distress.     Appearance: He is well-developed.   Cardiovascular:      Rate and Rhythm: Normal rate and regular rhythm.   Pulmonary:      Effort: Pulmonary effort is normal.      Breath sounds: Normal breath sounds.   Neurological:      Mental Status: He is alert and oriented to person, place, and time.   Psychiatric:         Behavior: Behavior normal.         Thought Content: Thought content normal.             Diagnoses and all orders for this visit:    1. Primary insomnia (Primary)  -     zolpidem (Ambien) 10 MG tablet; Take 1 tablet by mouth At Night As Needed for Sleep. GENERIC BRAND ONLY  .  Dispense: 90 tablet; Refill: 0    2. Acute URI  -     doxycycline (VIBRAMYICN) 100 MG tablet; Take 1 tablet by mouth 2 (Two) Times a Day.  Dispense: 20 tablet; Refill: 0  -     methylPREDNISolone (Medrol) 4 MG dose pack; follow package directions  Dispense: 21 tablet; Refill: 0  -     benzonatate (TESSALON) 200 MG capsule; Take 1 capsule by mouth 3 (Three) Times a Day As Needed for Cough.  Dispense: 30 capsule; Refill: 5                oral

## 2024-09-04 NOTE — CHART NOTE - NSCHARTNOTEFT_GEN_A_CORE
PACU ANESTHESIA ADMISSION NOTE      Procedure:   Post op diagnosis:      ____  Intubated  TV:______       Rate: ______      FiO2: ______    __x__  Patent Airway    __x__  Full return of protective reflexes    __x__  Full recovery from anesthesia / back to baseline status    Vitals:  T(C): 36.6 (09-04-24 @ 12:08), Max: 36.6 (09-04-24 @ 10:09)  HR: 64 (09-04-24 @ 12:08) (64 - 64)  BP: 148/91 (09-04-24 @ 12:08) (148/91 - 148/91)  RR: 20 (09-04-24 @ 12:08) (20 - 20)  SpO2: 98% (09-04-24 @ 12:08) (98% - 98%)    Mental Status:  __x__ Awake   ___x__ Alert   _____ Drowsy   _____ Sedated    Nausea/Vomiting:  __x__ NO  ______Yes,   See Post - Op Orders          Pain Scale (0-10):  _____    Treatment: ____ None    __x__ See Post - Op/PCA Orders    Post - Operative Fluids:   ____ Oral   __x__ See Post - Op Orders    Plan: Discharge:   ___x_Home       _____Floor     _____Critical Care    _____  Other:_________________    Comments: Patient had smooth intraoperative event, no anesthesia complication.  PACU Vital signs: HR:    88        BP:  137      /65          RR:    15         O2 Sat:   94    %     Temp 98

## 2024-09-04 NOTE — ED PROVIDER NOTE - PROGRESS NOTE DETAILS
EP: Patient appears clinically well, drinking water, EKG appears unchanged from prior.  Patient declines any further testing in ED, wishes to go home, has an appointment with his PCP on 9/16.  Exam is reassuring, was advised to return to the emergency room immediately for any concerning symptoms.  He verbalized understanding is amenable to plan.

## 2024-09-04 NOTE — ED ADULT TRIAGE NOTE - CHIEF COMPLAINT QUOTE
Pt BIBA, had surgery to repair L whitmore muscle. Pt was found unconscious for 10min and diaphoretic at 8pm by family. Pt BIBA, had surgery to repair L whitmore muscle. Pt was found unconscious for 10min and diaphoretic at 8pm by family. Cleared by crit MD Brothers

## 2024-09-09 ENCOUNTER — APPOINTMENT (OUTPATIENT)
Dept: ORTHOPEDIC SURGERY | Facility: CLINIC | Age: 54
End: 2024-09-09
Payer: COMMERCIAL

## 2024-09-09 PROCEDURE — 99024 POSTOP FOLLOW-UP VISIT: CPT

## 2024-09-09 RX ORDER — SULFAMETHOXAZOLE AND TRIMETHOPRIM 800; 160 MG/1; MG/1
800-160 TABLET ORAL TWICE DAILY
Qty: 10 | Refills: 0 | Status: ACTIVE | COMMUNITY
Start: 2024-09-09 | End: 1900-01-01

## 2024-09-09 NOTE — HISTORY OF PRESENT ILLNESS
[de-identified] : The patient is a 53-year-old male accompanied by his spouse here for reevaluation today.  He is status post left pectoralis major tendon repair on 9/4/2024.  He did notice bruising from his chest wall to his left forearm.  He states his left forearm feels hot.  He is concerned for an infection in the forearm.

## 2024-09-09 NOTE — DISCUSSION/SUMMARY
[de-identified] : Today we discussed a treatment plan.  I did inform him that the bruising there is normal.  He will remain in the sling.  He is concerned for an infection.  Since there are a few red dots at the distal area of ecchymosis an Rx for Bactrim sent to the pharmacy since he is penicillin allergic.  I did inform him that the medication can give him diarrhea.  He will follow-up on 9/12/2024 with Kenny at his regular scheduled initial postoperative visit.

## 2024-09-09 NOTE — IMAGING
[de-identified] : Physical exam of the left forearm: There is ecchymosis noted over the flexor surface of the left forearm.  There are few red dots near the distal area of ecchymosis.  It is warm to touch compared to the right forearm.  Full range of motion of the left elbow with flexion and extension.  Intact to light touch.

## 2024-09-12 ENCOUNTER — APPOINTMENT (OUTPATIENT)
Dept: ORTHOPEDIC SURGERY | Facility: CLINIC | Age: 54
End: 2024-09-12
Payer: COMMERCIAL

## 2024-09-12 DIAGNOSIS — S29.011A STRAIN OF MUSCLE AND TENDON OF FRONT WALL OF THORAX, INITIAL ENCOUNTER: ICD-10-CM

## 2024-09-12 PROCEDURE — 99024 POSTOP FOLLOW-UP VISIT: CPT

## 2024-09-12 NOTE — HISTORY OF PRESENT ILLNESS
[de-identified] : Pt is s/p left pec major repair Doing well. Pain controlled  NAD Left shoulder Incisions healed Mild diffuse TTP Compartments soft and NT ROM limited secondary to pain NVI  s/p left shoulder pec major repair explained musculotendinous tear and risk for re-rupture went over the surgery in detail will start pt, protocol provided continue pain control as needed fu in 1 month all questions answered

## 2024-10-31 ENCOUNTER — APPOINTMENT (OUTPATIENT)
Dept: ORTHOPEDIC SURGERY | Facility: CLINIC | Age: 54
End: 2024-10-31